# Patient Record
Sex: FEMALE | Race: ASIAN | NOT HISPANIC OR LATINO | Employment: UNEMPLOYED | URBAN - METROPOLITAN AREA
[De-identification: names, ages, dates, MRNs, and addresses within clinical notes are randomized per-mention and may not be internally consistent; named-entity substitution may affect disease eponyms.]

---

## 2017-03-23 ENCOUNTER — OFFICE VISIT - HEALTHEAST (OUTPATIENT)
Dept: FAMILY MEDICINE | Facility: CLINIC | Age: 32
End: 2017-03-23

## 2017-03-23 ENCOUNTER — HOSPITAL ENCOUNTER (OUTPATIENT)
Dept: LAB | Age: 32
Setting detail: SPECIMEN
Discharge: HOME | End: 2017-03-23

## 2017-03-23 DIAGNOSIS — R50.9 FEVER: ICD-10-CM

## 2017-03-23 DIAGNOSIS — R53.83 FATIGUE: ICD-10-CM

## 2017-03-23 DIAGNOSIS — J02.9 SORE THROAT: ICD-10-CM

## 2017-04-21 ENCOUNTER — OFFICE VISIT - HEALTHEAST (OUTPATIENT)
Dept: FAMILY MEDICINE | Facility: CLINIC | Age: 32
End: 2017-04-21

## 2017-04-21 DIAGNOSIS — J20.9 BRONCHITIS, ACUTE: ICD-10-CM

## 2017-06-09 ENCOUNTER — COMMUNICATION - HEALTHEAST (OUTPATIENT)
Dept: PULMONOLOGY | Facility: OTHER | Age: 32
End: 2017-06-09

## 2017-06-09 ENCOUNTER — OFFICE VISIT - HEALTHEAST (OUTPATIENT)
Dept: FAMILY MEDICINE | Facility: CLINIC | Age: 32
End: 2017-06-09

## 2017-06-09 DIAGNOSIS — J20.9 BRONCHITIS, ACUTE: ICD-10-CM

## 2017-06-09 DIAGNOSIS — Z87.828 HISTORY OF TRAUMATIC HEAD INJURY: ICD-10-CM

## 2017-06-09 DIAGNOSIS — F43.10 PTSD (POST-TRAUMATIC STRESS DISORDER): ICD-10-CM

## 2017-06-09 DIAGNOSIS — J30.2 ALLERGIC RHINITIS, SEASONAL: ICD-10-CM

## 2017-06-09 DIAGNOSIS — J98.4 OTHER DISEASES OF LUNG, NOT ELSEWHERE CLASSIFIED: ICD-10-CM

## 2017-06-09 ASSESSMENT — MIFFLIN-ST. JEOR: SCORE: 912.79

## 2017-07-13 ENCOUNTER — OFFICE VISIT - HEALTHEAST (OUTPATIENT)
Dept: PULMONOLOGY | Facility: OTHER | Age: 32
End: 2017-07-13

## 2017-07-13 DIAGNOSIS — J20.9 ACUTE BRONCHITIS, UNSPECIFIED ORGANISM: ICD-10-CM

## 2017-07-13 ASSESSMENT — MIFFLIN-ST. JEOR: SCORE: 919.14

## 2017-09-19 ENCOUNTER — OFFICE VISIT - HEALTHEAST (OUTPATIENT)
Dept: FAMILY MEDICINE | Facility: CLINIC | Age: 32
End: 2017-09-19

## 2017-09-19 ENCOUNTER — RECORDS - HEALTHEAST (OUTPATIENT)
Dept: ADMINISTRATIVE | Facility: OTHER | Age: 32
End: 2017-09-19

## 2017-09-19 DIAGNOSIS — B85.2 LICE: ICD-10-CM

## 2017-09-19 DIAGNOSIS — J98.4 OTHER DISEASES OF LUNG, NOT ELSEWHERE CLASSIFIED: ICD-10-CM

## 2017-09-19 DIAGNOSIS — F43.10 PTSD (POST-TRAUMATIC STRESS DISORDER): ICD-10-CM

## 2017-09-19 DIAGNOSIS — Z23 NEED FOR IMMUNIZATION AGAINST INFLUENZA: ICD-10-CM

## 2017-09-19 ASSESSMENT — MIFFLIN-ST. JEOR: SCORE: 917.32

## 2017-11-08 ENCOUNTER — OFFICE VISIT - HEALTHEAST (OUTPATIENT)
Dept: FAMILY MEDICINE | Facility: CLINIC | Age: 32
End: 2017-11-08

## 2017-11-08 DIAGNOSIS — Z30.46 ENCOUNTER FOR NEXPLANON REMOVAL: ICD-10-CM

## 2017-11-08 ASSESSMENT — MIFFLIN-ST. JEOR: SCORE: 911.65

## 2017-12-05 ENCOUNTER — COMMUNICATION - HEALTHEAST (OUTPATIENT)
Dept: FAMILY MEDICINE | Facility: CLINIC | Age: 32
End: 2017-12-05

## 2017-12-05 ENCOUNTER — OFFICE VISIT - HEALTHEAST (OUTPATIENT)
Dept: FAMILY MEDICINE | Facility: CLINIC | Age: 32
End: 2017-12-05

## 2017-12-05 DIAGNOSIS — R05.9 COUGH: ICD-10-CM

## 2017-12-05 DIAGNOSIS — J98.4 RESTRICTIVE LUNG DISEASE: ICD-10-CM

## 2017-12-05 DIAGNOSIS — M41.20 SCOLIOSIS (AND KYPHOSCOLIOSIS), IDIOPATHIC: ICD-10-CM

## 2017-12-05 DIAGNOSIS — R53.83 FATIGUE: ICD-10-CM

## 2017-12-05 DIAGNOSIS — R06.02 SOB (SHORTNESS OF BREATH): ICD-10-CM

## 2017-12-05 DIAGNOSIS — J02.9 SORE THROAT: ICD-10-CM

## 2017-12-09 ENCOUNTER — OFFICE VISIT - HEALTHEAST (OUTPATIENT)
Dept: FAMILY MEDICINE | Facility: CLINIC | Age: 32
End: 2017-12-09

## 2017-12-09 DIAGNOSIS — R53.83 FATIGUE: ICD-10-CM

## 2017-12-09 DIAGNOSIS — M89.8X1 PAIN OF RIGHT SCAPULA: ICD-10-CM

## 2018-01-13 ENCOUNTER — OFFICE VISIT - HEALTHEAST (OUTPATIENT)
Dept: FAMILY MEDICINE | Facility: CLINIC | Age: 33
End: 2018-01-13

## 2018-01-13 DIAGNOSIS — K21.9 GERD (GASTROESOPHAGEAL REFLUX DISEASE): ICD-10-CM

## 2018-01-13 DIAGNOSIS — J02.0 STREP SORE THROAT: ICD-10-CM

## 2018-01-13 LAB — DEPRECATED S PYO AG THROAT QL EIA: ABNORMAL

## 2018-01-31 ENCOUNTER — AMBULATORY - HEALTHEAST (OUTPATIENT)
Dept: FAMILY MEDICINE | Facility: CLINIC | Age: 33
End: 2018-01-31

## 2018-01-31 ENCOUNTER — AMBULATORY - HEALTHEAST (OUTPATIENT)
Dept: NURSING | Facility: CLINIC | Age: 33
End: 2018-01-31

## 2018-01-31 DIAGNOSIS — Z30.42 ENCOUNTER FOR SURVEILLANCE OF INJECTABLE CONTRACEPTIVE: ICD-10-CM

## 2018-01-31 LAB
HCG UR QL: NEGATIVE
SP GR UR STRIP: 1.02 (ref 1–1.03)

## 2018-02-01 ENCOUNTER — OFFICE VISIT - HEALTHEAST (OUTPATIENT)
Dept: FAMILY MEDICINE | Facility: CLINIC | Age: 33
End: 2018-02-01

## 2018-02-01 DIAGNOSIS — E55.9 VITAMIN D DEFICIENCY: ICD-10-CM

## 2018-02-01 DIAGNOSIS — Z00.00 ROUTINE GENERAL MEDICAL EXAMINATION AT A HEALTH CARE FACILITY: ICD-10-CM

## 2018-02-01 DIAGNOSIS — R63.6 UNDERWEIGHT: ICD-10-CM

## 2018-02-01 DIAGNOSIS — D64.9 ANEMIA: ICD-10-CM

## 2018-02-01 DIAGNOSIS — N91.2 AMENORRHEA: ICD-10-CM

## 2018-02-01 DIAGNOSIS — M41.20 SCOLIOSIS (AND KYPHOSCOLIOSIS), IDIOPATHIC: ICD-10-CM

## 2018-02-01 DIAGNOSIS — D72.829 ELEVATED WBC COUNT: ICD-10-CM

## 2018-02-01 LAB
ALBUMIN SERPL-MCNC: 3.7 G/DL (ref 3.5–5)
ALP SERPL-CCNC: 64 U/L (ref 45–120)
ALT SERPL W P-5'-P-CCNC: 15 U/L (ref 0–45)
ANION GAP SERPL CALCULATED.3IONS-SCNC: 11 MMOL/L (ref 5–18)
AST SERPL W P-5'-P-CCNC: 20 U/L (ref 0–40)
BASOPHILS # BLD AUTO: 0 THOU/UL (ref 0–0.2)
BASOPHILS NFR BLD AUTO: 1 % (ref 0–2)
BILIRUB SERPL-MCNC: 1 MG/DL (ref 0–1)
BUN SERPL-MCNC: 13 MG/DL (ref 8–22)
CALCIUM SERPL-MCNC: 9.3 MG/DL (ref 8.5–10.5)
CHLORIDE BLD-SCNC: 104 MMOL/L (ref 98–107)
CHOLEST SERPL-MCNC: 192 MG/DL
CLUE CELLS: NORMAL
CO2 SERPL-SCNC: 25 MMOL/L (ref 22–31)
CREAT SERPL-MCNC: 0.74 MG/DL (ref 0.6–1.1)
EOSINOPHIL # BLD AUTO: 0.5 THOU/UL (ref 0–0.4)
EOSINOPHIL NFR BLD AUTO: 8 % (ref 0–6)
ERYTHROCYTE [DISTWIDTH] IN BLOOD BY AUTOMATED COUNT: 12.4 % (ref 11–14.5)
FASTING STATUS PATIENT QL REPORTED: NO
GFR SERPL CREATININE-BSD FRML MDRD: >60 ML/MIN/1.73M2
GLUCOSE BLD-MCNC: 94 MG/DL (ref 70–125)
HCT VFR BLD AUTO: 38 % (ref 35–47)
HDLC SERPL-MCNC: 57 MG/DL
HGB BLD-MCNC: 12 G/DL (ref 12–16)
LDLC SERPL CALC-MCNC: 122 MG/DL
LYMPHOCYTES # BLD AUTO: 2.2 THOU/UL (ref 0.8–4.4)
LYMPHOCYTES NFR BLD AUTO: 36 % (ref 20–40)
MCH RBC QN AUTO: 26.4 PG (ref 27–34)
MCHC RBC AUTO-ENTMCNC: 31.5 G/DL (ref 32–36)
MCV RBC AUTO: 84 FL (ref 80–100)
MONOCYTES # BLD AUTO: 0.6 THOU/UL (ref 0–0.9)
MONOCYTES NFR BLD AUTO: 9 % (ref 2–10)
NEUTROPHILS # BLD AUTO: 2.9 THOU/UL (ref 2–7.7)
NEUTROPHILS NFR BLD AUTO: 47 % (ref 50–70)
PLATELET # BLD AUTO: 268 THOU/UL (ref 140–440)
PMV BLD AUTO: 8.5 FL (ref 7–10)
POTASSIUM BLD-SCNC: 4.5 MMOL/L (ref 3.5–5)
PROLACTIN SERPL-MCNC: 7.4 NG/ML (ref 0–20)
PROT SERPL-MCNC: 7.9 G/DL (ref 6–8)
RBC # BLD AUTO: 4.52 MILL/UL (ref 3.8–5.4)
SODIUM SERPL-SCNC: 140 MMOL/L (ref 136–145)
TRICHOMONAS, WET PREP: NORMAL
TRIGL SERPL-MCNC: 64 MG/DL
TSH SERPL DL<=0.005 MIU/L-ACNC: 1.39 UIU/ML (ref 0.3–5)
WBC: 6.2 THOU/UL (ref 4–11)
YEAST, WET PREP: NORMAL

## 2018-02-01 ASSESSMENT — MIFFLIN-ST. JEOR: SCORE: 927.52

## 2018-02-02 LAB
25(OH)D3 SERPL-MCNC: 10.4 NG/ML (ref 30–80)
HPV SOURCE: NORMAL
HUMAN PAPILLOMA VIRUS 16 DNA: NEGATIVE
HUMAN PAPILLOMA VIRUS 18 DNA: NEGATIVE
HUMAN PAPILLOMA VIRUS FINAL DIAGNOSIS: NORMAL
HUMAN PAPILLOMA VIRUS OTHER HR: NEGATIVE
SPECIMEN DESCRIPTION: NORMAL

## 2018-02-08 LAB
BKR LAB AP ABNORMAL BLEEDING: NO
BKR LAB AP BIRTH CONTROL/HORMONES: NORMAL
BKR LAB AP CERVICAL APPEARANCE: NORMAL
BKR LAB AP GYN ADEQUACY: NORMAL
BKR LAB AP GYN INTERPRETATION: NORMAL
BKR LAB AP HPV REFLEX: NORMAL
BKR LAB AP LMP: NORMAL
BKR LAB AP PATIENT STATUS: NORMAL
BKR LAB AP PREVIOUS ABNORMAL: NO
BKR LAB AP PREVIOUS NORMAL: NORMAL
HIGH RISK?: NO
PATH REPORT.COMMENTS IMP SPEC: NORMAL
RESULT FLAG (HE HISTORICAL CONVERSION): NORMAL

## 2018-02-14 ENCOUNTER — COMMUNICATION - HEALTHEAST (OUTPATIENT)
Dept: FAMILY MEDICINE | Facility: CLINIC | Age: 33
End: 2018-02-14

## 2018-02-15 ENCOUNTER — RECORDS - HEALTHEAST (OUTPATIENT)
Dept: ADMINISTRATIVE | Facility: OTHER | Age: 33
End: 2018-02-15

## 2018-03-15 ENCOUNTER — OFFICE VISIT - HEALTHEAST (OUTPATIENT)
Dept: FAMILY MEDICINE | Facility: CLINIC | Age: 33
End: 2018-03-15

## 2018-03-15 DIAGNOSIS — J98.4 RESTRICTIVE LUNG DISEASE: ICD-10-CM

## 2018-03-15 DIAGNOSIS — R05.8 POST-VIRAL COUGH SYNDROME: ICD-10-CM

## 2018-03-15 ASSESSMENT — MIFFLIN-ST. JEOR: SCORE: 942.27

## 2018-04-10 ENCOUNTER — OFFICE VISIT - HEALTHEAST (OUTPATIENT)
Dept: FAMILY MEDICINE | Facility: CLINIC | Age: 33
End: 2018-04-10

## 2018-04-10 DIAGNOSIS — J02.9 SORE THROAT: ICD-10-CM

## 2018-04-10 DIAGNOSIS — R09.82 POST-NASAL DRIP: ICD-10-CM

## 2018-04-10 LAB — DEPRECATED S PYO AG THROAT QL EIA: NORMAL

## 2018-04-11 LAB — GROUP A STREP BY PCR: NORMAL

## 2018-05-30 ENCOUNTER — RECORDS - HEALTHEAST (OUTPATIENT)
Dept: ADMINISTRATIVE | Facility: OTHER | Age: 33
End: 2018-05-30

## 2018-09-21 ENCOUNTER — OFFICE VISIT - HEALTHEAST (OUTPATIENT)
Dept: FAMILY MEDICINE | Facility: CLINIC | Age: 33
End: 2018-09-21

## 2018-09-21 DIAGNOSIS — J02.9 SORE THROAT: ICD-10-CM

## 2018-09-21 DIAGNOSIS — J30.1 ACUTE SEASONAL ALLERGIC RHINITIS DUE TO POLLEN: ICD-10-CM

## 2018-09-21 DIAGNOSIS — Z23 NEED FOR IMMUNIZATION AGAINST INFLUENZA: ICD-10-CM

## 2018-09-21 DIAGNOSIS — R05.8 DRY COUGH: ICD-10-CM

## 2018-09-21 LAB — DEPRECATED S PYO AG THROAT QL EIA: ABNORMAL

## 2018-09-21 ASSESSMENT — MIFFLIN-ST. JEOR: SCORE: 932.06

## 2018-09-22 ENCOUNTER — COMMUNICATION - HEALTHEAST (OUTPATIENT)
Dept: FAMILY MEDICINE | Facility: CLINIC | Age: 33
End: 2018-09-22

## 2019-04-16 ENCOUNTER — COMMUNICATION - HEALTHEAST (OUTPATIENT)
Dept: FAMILY MEDICINE | Facility: CLINIC | Age: 34
End: 2019-04-16

## 2019-04-16 DIAGNOSIS — Z11.1 SCREENING FOR TUBERCULOSIS: ICD-10-CM

## 2019-04-19 ENCOUNTER — AMBULATORY - HEALTHEAST (OUTPATIENT)
Dept: LAB | Facility: CLINIC | Age: 34
End: 2019-04-19

## 2019-04-19 DIAGNOSIS — Z11.1 SCREENING FOR TUBERCULOSIS: ICD-10-CM

## 2019-04-22 ENCOUNTER — COMMUNICATION - HEALTHEAST (OUTPATIENT)
Dept: FAMILY MEDICINE | Facility: CLINIC | Age: 34
End: 2019-04-22

## 2019-04-22 LAB
GAMMA INTERFERON BACKGROUND BLD IA-ACNC: 0.17 IU/ML
M TB IFN-G BLD-IMP: NEGATIVE
MITOGEN IGNF BCKGRD COR BLD-ACNC: 0.14 IU/ML
MITOGEN IGNF BCKGRD COR BLD-ACNC: 0.23 IU/ML
QTF INTERPRETATION: NORMAL
QTF MITOGEN - NIL: 8.77 IU/ML

## 2019-06-06 ENCOUNTER — OFFICE VISIT - HEALTHEAST (OUTPATIENT)
Dept: FAMILY MEDICINE | Facility: CLINIC | Age: 34
End: 2019-06-06

## 2019-07-05 ENCOUNTER — SURGERY - HEALTHEAST (OUTPATIENT)
Dept: SURGERY | Facility: HOSPITAL | Age: 34
End: 2019-07-05

## 2019-07-05 ENCOUNTER — ANESTHESIA - HEALTHEAST (OUTPATIENT)
Dept: SURGERY | Facility: HOSPITAL | Age: 34
End: 2019-07-05

## 2019-07-05 ASSESSMENT — MIFFLIN-ST. JEOR: SCORE: 940.79

## 2019-07-06 ASSESSMENT — MIFFLIN-ST. JEOR: SCORE: 1006.56

## 2019-07-09 ENCOUNTER — OFFICE VISIT - HEALTHEAST (OUTPATIENT)
Dept: FAMILY MEDICINE | Facility: CLINIC | Age: 34
End: 2019-07-09

## 2019-07-09 ENCOUNTER — COMMUNICATION - HEALTHEAST (OUTPATIENT)
Dept: CARE COORDINATION | Facility: CLINIC | Age: 34
End: 2019-07-09

## 2019-07-09 DIAGNOSIS — D62 ANEMIA DUE TO BLOOD LOSS, ACUTE: ICD-10-CM

## 2019-07-09 DIAGNOSIS — R63.6 UNDERWEIGHT: ICD-10-CM

## 2019-07-09 DIAGNOSIS — K59.01 SLOW TRANSIT CONSTIPATION: ICD-10-CM

## 2019-07-09 DIAGNOSIS — K35.30 ACUTE APPENDICITIS WITH LOCALIZED PERITONITIS, UNSPECIFIED WHETHER ABSCESS PRESENT, UNSPECIFIED WHETHER GANGRENE PRESENT, UNSPECIFIED WHETHER PERFORATION PRESENT: ICD-10-CM

## 2019-07-09 ASSESSMENT — MIFFLIN-ST. JEOR: SCORE: 950.77

## 2019-11-07 ENCOUNTER — OFFICE VISIT - HEALTHEAST (OUTPATIENT)
Dept: FAMILY MEDICINE | Facility: CLINIC | Age: 34
End: 2019-11-07

## 2019-11-07 DIAGNOSIS — R05.9 COUGH: ICD-10-CM

## 2019-11-07 DIAGNOSIS — R50.9 FEVER, UNSPECIFIED FEVER CAUSE: ICD-10-CM

## 2019-11-07 DIAGNOSIS — J20.9 ACUTE BRONCHITIS, UNSPECIFIED ORGANISM: ICD-10-CM

## 2019-11-07 LAB
DEPRECATED S PYO AG THROAT QL EIA: NORMAL
FLUAV AG SPEC QL IA: NORMAL
FLUBV AG SPEC QL IA: NORMAL

## 2019-11-07 ASSESSMENT — MIFFLIN-ST. JEOR: SCORE: 934.33

## 2019-11-08 LAB — GROUP A STREP BY PCR: NORMAL

## 2019-11-11 ENCOUNTER — COMMUNICATION - HEALTHEAST (OUTPATIENT)
Dept: FAMILY MEDICINE | Facility: CLINIC | Age: 34
End: 2019-11-11

## 2019-12-23 ENCOUNTER — RECORDS - HEALTHEAST (OUTPATIENT)
Dept: ADMINISTRATIVE | Facility: OTHER | Age: 34
End: 2019-12-23

## 2020-01-03 ENCOUNTER — OFFICE VISIT - HEALTHEAST (OUTPATIENT)
Dept: FAMILY MEDICINE | Facility: CLINIC | Age: 35
End: 2020-01-03

## 2020-01-03 DIAGNOSIS — Z09 HOSPITAL DISCHARGE FOLLOW-UP: ICD-10-CM

## 2020-01-03 DIAGNOSIS — D64.9 NORMOCYTIC ANEMIA: ICD-10-CM

## 2020-01-03 DIAGNOSIS — J18.9 COMMUNITY ACQUIRED PNEUMONIA OF RIGHT LUNG, UNSPECIFIED PART OF LUNG: ICD-10-CM

## 2020-01-03 DIAGNOSIS — Z23 NEED FOR IMMUNIZATION AGAINST INFLUENZA: ICD-10-CM

## 2020-01-03 LAB
FERRITIN SERPL-MCNC: 109 NG/ML (ref 10–130)
FOLATE SERPL-MCNC: 4.2 NG/ML
IRON SERPL-MCNC: 103 UG/DL (ref 42–175)
VIT B12 SERPL-MCNC: 722 PG/ML (ref 213–816)

## 2020-01-03 ASSESSMENT — MIFFLIN-ST. JEOR: SCORE: 934.33

## 2020-01-09 ENCOUNTER — COMMUNICATION - HEALTHEAST (OUTPATIENT)
Dept: FAMILY MEDICINE | Facility: CLINIC | Age: 35
End: 2020-01-09

## 2020-01-09 LAB
HEMOGLOBIN A2 QUANTITATION: 3.3 % (ref 2.2–3.5)
HEMOGLOBIN ELECTROPHRESIS: NORMAL
HEMOGLOBIN F QUANTITATION: <0.8 % (ref 0–2)
PATH ICD:: NORMAL
REVIEWING PATHOLOGIST: NORMAL

## 2020-02-01 ENCOUNTER — OFFICE VISIT - HEALTHEAST (OUTPATIENT)
Dept: FAMILY MEDICINE | Facility: CLINIC | Age: 35
End: 2020-02-01

## 2020-02-01 DIAGNOSIS — R07.0 THROAT PAIN: ICD-10-CM

## 2020-02-01 DIAGNOSIS — J11.1 INFLUENZA-LIKE ILLNESS: ICD-10-CM

## 2020-02-01 LAB — DEPRECATED S PYO AG THROAT QL EIA: NORMAL

## 2020-02-01 ASSESSMENT — MIFFLIN-ST. JEOR: SCORE: 935.69

## 2020-02-02 LAB — GROUP A STREP BY PCR: NORMAL

## 2020-03-03 ENCOUNTER — OFFICE VISIT - HEALTHEAST (OUTPATIENT)
Dept: FAMILY MEDICINE | Facility: CLINIC | Age: 35
End: 2020-03-03

## 2020-08-11 ENCOUNTER — OFFICE VISIT - HEALTHEAST (OUTPATIENT)
Dept: FAMILY MEDICINE | Facility: CLINIC | Age: 35
End: 2020-08-11

## 2020-08-11 DIAGNOSIS — R63.6 UNDERWEIGHT: ICD-10-CM

## 2020-08-11 DIAGNOSIS — R63.0 POOR APPETITE: ICD-10-CM

## 2020-08-11 DIAGNOSIS — G47.00 INSOMNIA, UNSPECIFIED TYPE: ICD-10-CM

## 2020-08-11 DIAGNOSIS — Z31.69 PRE-CONCEPTION COUNSELING: ICD-10-CM

## 2020-08-11 LAB — TSH SERPL DL<=0.005 MIU/L-ACNC: 1.41 UIU/ML (ref 0.3–5)

## 2020-08-11 RX ORDER — PRENATAL VIT/IRON FUM/FOLIC AC 27MG-0.8MG
1 TABLET ORAL DAILY
Qty: 90 TABLET | Refills: 3 | Status: SHIPPED | OUTPATIENT
Start: 2020-08-11 | End: 2023-01-08

## 2020-08-11 RX ORDER — MIRTAZAPINE 7.5 MG/1
7.5 TABLET, FILM COATED ORAL AT BEDTIME
Qty: 90 TABLET | Refills: 4 | Status: SHIPPED | OUTPATIENT
Start: 2020-08-11 | End: 2023-01-08

## 2020-08-11 ASSESSMENT — MIFFLIN-ST. JEOR: SCORE: 941.7

## 2020-08-14 ENCOUNTER — OFFICE VISIT - HEALTHEAST (OUTPATIENT)
Dept: FAMILY MEDICINE | Facility: CLINIC | Age: 35
End: 2020-08-14

## 2020-08-14 DIAGNOSIS — J02.9 SORE THROAT: ICD-10-CM

## 2020-08-14 DIAGNOSIS — H66.002 NON-RECURRENT ACUTE SUPPURATIVE OTITIS MEDIA OF LEFT EAR WITHOUT SPONTANEOUS RUPTURE OF TYMPANIC MEMBRANE: ICD-10-CM

## 2020-08-14 LAB — DEPRECATED S PYO AG THROAT QL EIA: NORMAL

## 2020-08-15 LAB — GROUP A STREP BY PCR: NORMAL

## 2020-09-05 ENCOUNTER — OFFICE VISIT - HEALTHEAST (OUTPATIENT)
Dept: FAMILY MEDICINE | Facility: CLINIC | Age: 35
End: 2020-09-05

## 2020-09-05 DIAGNOSIS — N39.0 URINARY TRACT INFECTION IN FEMALE: ICD-10-CM

## 2020-09-05 DIAGNOSIS — R31.0 GROSS HEMATURIA: ICD-10-CM

## 2020-09-05 LAB
ALBUMIN UR-MCNC: ABNORMAL MG/DL
APPEARANCE UR: CLEAR
BACTERIA #/AREA URNS HPF: ABNORMAL HPF
BILIRUB UR QL STRIP: ABNORMAL
COLOR UR AUTO: YELLOW
GLUCOSE UR STRIP-MCNC: NEGATIVE MG/DL
HGB UR QL STRIP: ABNORMAL
KETONES UR STRIP-MCNC: NEGATIVE MG/DL
LEUKOCYTE ESTERASE UR QL STRIP: ABNORMAL
NITRATE UR QL: NEGATIVE
PH UR STRIP: 6.5 [PH] (ref 5–8)
RBC #/AREA URNS AUTO: >100 HPF
SP GR UR STRIP: 1.02 (ref 1–1.03)
SQUAMOUS #/AREA URNS AUTO: ABNORMAL LPF
UROBILINOGEN UR STRIP-ACNC: ABNORMAL
WBC #/AREA URNS AUTO: ABNORMAL HPF

## 2020-09-08 LAB — BACTERIA SPEC CULT: ABNORMAL

## 2020-09-09 ENCOUNTER — COMMUNICATION - HEALTHEAST (OUTPATIENT)
Dept: FAMILY MEDICINE | Facility: CLINIC | Age: 35
End: 2020-09-09

## 2020-09-09 DIAGNOSIS — N30.01 ACUTE CYSTITIS WITH HEMATURIA: ICD-10-CM

## 2020-09-22 ENCOUNTER — OFFICE VISIT - HEALTHEAST (OUTPATIENT)
Dept: FAMILY MEDICINE | Facility: CLINIC | Age: 35
End: 2020-09-22

## 2020-09-22 DIAGNOSIS — Z30.432 ENCOUNTER FOR IUD REMOVAL: ICD-10-CM

## 2020-09-22 DIAGNOSIS — Z23 NEED FOR IMMUNIZATION AGAINST INFLUENZA: ICD-10-CM

## 2020-09-22 DIAGNOSIS — N89.8 VAGINAL DISCHARGE: ICD-10-CM

## 2020-09-22 LAB
CLUE CELLS: NORMAL
TRICHOMONAS, WET PREP: NORMAL
YEAST, WET PREP: NORMAL

## 2020-09-22 ASSESSMENT — MIFFLIN-ST. JEOR: SCORE: 952.72

## 2020-09-23 LAB
C TRACH DNA SPEC QL PROBE+SIG AMP: NEGATIVE
N GONORRHOEA DNA SPEC QL NAA+PROBE: NEGATIVE

## 2020-10-13 ENCOUNTER — OFFICE VISIT - HEALTHEAST (OUTPATIENT)
Dept: FAMILY MEDICINE | Facility: CLINIC | Age: 35
End: 2020-10-13

## 2020-10-13 DIAGNOSIS — M54.50 RIGHT-SIDED LOW BACK PAIN WITHOUT SCIATICA, UNSPECIFIED CHRONICITY: ICD-10-CM

## 2020-10-13 DIAGNOSIS — R30.0 DYSURIA: ICD-10-CM

## 2020-10-13 LAB
ALBUMIN UR-MCNC: NEGATIVE MG/DL
APPEARANCE UR: ABNORMAL
BACTERIA #/AREA URNS HPF: ABNORMAL HPF
BILIRUB UR QL STRIP: NEGATIVE
COLOR UR AUTO: YELLOW
GLUCOSE UR STRIP-MCNC: NEGATIVE MG/DL
HGB UR QL STRIP: ABNORMAL
KETONES UR STRIP-MCNC: NEGATIVE MG/DL
LEUKOCYTE ESTERASE UR QL STRIP: NEGATIVE
NITRATE UR QL: NEGATIVE
PH UR STRIP: 6.5 [PH] (ref 5–8)
RBC #/AREA URNS AUTO: ABNORMAL HPF
SP GR UR STRIP: 1.02 (ref 1–1.03)
SQUAMOUS #/AREA URNS AUTO: ABNORMAL LPF
UROBILINOGEN UR STRIP-ACNC: ABNORMAL
WBC #/AREA URNS AUTO: ABNORMAL HPF

## 2020-10-13 ASSESSMENT — MIFFLIN-ST. JEOR: SCORE: 968.77

## 2020-10-23 ENCOUNTER — OFFICE VISIT - HEALTHEAST (OUTPATIENT)
Dept: FAMILY MEDICINE | Facility: CLINIC | Age: 35
End: 2020-10-23

## 2020-10-23 DIAGNOSIS — R30.0 DYSURIA: ICD-10-CM

## 2020-10-23 DIAGNOSIS — N39.0 URINARY TRACT INFECTION WITHOUT HEMATURIA, SITE UNSPECIFIED: ICD-10-CM

## 2020-10-23 LAB
ALBUMIN UR-MCNC: ABNORMAL MG/DL
APPEARANCE UR: ABNORMAL
BACTERIA #/AREA URNS HPF: ABNORMAL HPF
BILIRUB UR QL STRIP: NEGATIVE
COLOR UR AUTO: YELLOW
GLUCOSE UR STRIP-MCNC: ABNORMAL MG/DL
HCG UR QL: NEGATIVE
HGB UR QL STRIP: ABNORMAL
KETONES UR STRIP-MCNC: NEGATIVE MG/DL
LEUKOCYTE ESTERASE UR QL STRIP: ABNORMAL
NITRATE UR QL: NEGATIVE
PH UR STRIP: 6 [PH] (ref 5–8)
RBC #/AREA URNS AUTO: ABNORMAL HPF
SP GR UR STRIP: 1.02 (ref 1–1.03)
SQUAMOUS #/AREA URNS AUTO: ABNORMAL LPF
UROBILINOGEN UR STRIP-ACNC: ABNORMAL
WBC #/AREA URNS AUTO: >100 HPF
WBC CLUMPS #/AREA URNS HPF: PRESENT /[HPF]

## 2020-10-25 LAB — BACTERIA SPEC CULT: ABNORMAL

## 2021-04-19 ENCOUNTER — OFFICE VISIT - HEALTHEAST (OUTPATIENT)
Dept: FAMILY MEDICINE | Facility: CLINIC | Age: 36
End: 2021-04-19

## 2021-04-19 DIAGNOSIS — J02.9 SORE THROAT: ICD-10-CM

## 2021-04-19 DIAGNOSIS — K21.00 GASTROESOPHAGEAL REFLUX DISEASE WITH ESOPHAGITIS, UNSPECIFIED WHETHER HEMORRHAGE: ICD-10-CM

## 2021-04-19 LAB
DEPRECATED S PYO AG THROAT QL EIA: NORMAL
GROUP A STREP BY PCR: NORMAL

## 2021-04-19 RX ORDER — ACETAMINOPHEN 500 MG
1000 TABLET ORAL EVERY 6 HOURS PRN
Qty: 100 TABLET | Refills: 0 | Status: SHIPPED | OUTPATIENT
Start: 2021-04-19 | End: 2023-01-08

## 2021-04-20 ENCOUNTER — AMBULATORY - HEALTHEAST (OUTPATIENT)
Dept: FAMILY MEDICINE | Facility: CLINIC | Age: 36
End: 2021-04-20

## 2021-04-21 LAB
SARS-COV-2 PCR COMMENT: NORMAL
SARS-COV-2 RNA SPEC QL NAA+PROBE: NEGATIVE
SARS-COV-2 VIRUS SPECIMEN SOURCE: NORMAL

## 2021-04-22 ENCOUNTER — COMMUNICATION - HEALTHEAST (OUTPATIENT)
Dept: SCHEDULING | Facility: CLINIC | Age: 36
End: 2021-04-22

## 2021-05-10 ENCOUNTER — OFFICE VISIT - HEALTHEAST (OUTPATIENT)
Dept: FAMILY MEDICINE | Facility: CLINIC | Age: 36
End: 2021-05-10

## 2021-05-10 DIAGNOSIS — R30.0 DYSURIA: ICD-10-CM

## 2021-05-10 DIAGNOSIS — R10.30 LOWER ABDOMINAL PAIN: ICD-10-CM

## 2021-05-10 DIAGNOSIS — M54.50 ACUTE BILATERAL LOW BACK PAIN WITHOUT SCIATICA: ICD-10-CM

## 2021-05-10 LAB
ALBUMIN UR-MCNC: NEGATIVE G/DL
APPEARANCE UR: CLEAR
BACTERIA #/AREA URNS HPF: ABNORMAL /[HPF]
BILIRUB UR QL STRIP: NEGATIVE
CLUE CELLS: NORMAL
COLOR UR AUTO: YELLOW
GLUCOSE UR STRIP-MCNC: NEGATIVE MG/DL
HGB UR QL STRIP: NEGATIVE
KETONES UR STRIP-MCNC: NEGATIVE MG/DL
LEUKOCYTE ESTERASE UR QL STRIP: ABNORMAL
NITRATE UR QL: NEGATIVE
PH UR STRIP: 7 [PH] (ref 5–8)
RBC #/AREA URNS AUTO: ABNORMAL HPF
SP GR UR STRIP: 1.02 (ref 1–1.03)
SQUAMOUS #/AREA URNS AUTO: ABNORMAL LPF
TRICHOMONAS, WET PREP: NORMAL
UROBILINOGEN UR STRIP-ACNC: ABNORMAL
WBC #/AREA URNS AUTO: ABNORMAL HPF
YEAST, WET PREP: NORMAL

## 2021-05-11 LAB
BACTERIA SPEC CULT: NO GROWTH
C TRACH DNA SPEC QL PROBE+SIG AMP: NEGATIVE
N GONORRHOEA DNA SPEC QL NAA+PROBE: NEGATIVE

## 2021-05-14 ENCOUNTER — OFFICE VISIT - HEALTHEAST (OUTPATIENT)
Dept: FAMILY MEDICINE | Facility: CLINIC | Age: 36
End: 2021-05-14

## 2021-05-14 DIAGNOSIS — M54.50 ACUTE BILATERAL LOW BACK PAIN WITHOUT SCIATICA: ICD-10-CM

## 2021-05-14 DIAGNOSIS — Z01.419 ENCOUNTER FOR GYNECOLOGICAL EXAMINATION WITHOUT ABNORMAL FINDING: ICD-10-CM

## 2021-05-14 ASSESSMENT — MIFFLIN-ST. JEOR: SCORE: 1009.74

## 2021-05-17 LAB
HPV SOURCE: NORMAL
HUMAN PAPILLOMA VIRUS 16 DNA: NEGATIVE
HUMAN PAPILLOMA VIRUS 18 DNA: NEGATIVE
HUMAN PAPILLOMA VIRUS FINAL DIAGNOSIS: NORMAL
HUMAN PAPILLOMA VIRUS OTHER HR: NEGATIVE
SPECIMEN DESCRIPTION: NORMAL

## 2021-05-20 LAB
BKR LAB AP ABNORMAL BLEEDING: NO
BKR LAB AP BIRTH CONTROL/HORMONES: NORMAL
BKR LAB AP CERVICAL APPEARANCE: NORMAL
BKR LAB AP GYN ADEQUACY: NORMAL
BKR LAB AP GYN INTERPRETATION: NORMAL
BKR LAB AP HPV REFLEX: NORMAL
BKR LAB AP LMP: NORMAL
BKR LAB AP PATIENT STATUS: NORMAL
BKR LAB AP PREVIOUS ABNORMAL: NORMAL
BKR LAB AP PREVIOUS NORMAL: 2018
HIGH RISK?: NO
PATH REPORT.COMMENTS IMP SPEC: NORMAL
RESULT FLAG (HE HISTORICAL CONVERSION): NORMAL

## 2021-05-24 ENCOUNTER — COMMUNICATION - HEALTHEAST (OUTPATIENT)
Dept: FAMILY MEDICINE | Facility: CLINIC | Age: 36
End: 2021-05-24

## 2021-05-26 VITALS
DIASTOLIC BLOOD PRESSURE: 79 MMHG | OXYGEN SATURATION: 98 % | TEMPERATURE: 98.4 F | HEART RATE: 118 BPM | RESPIRATION RATE: 16 BRPM | SYSTOLIC BLOOD PRESSURE: 120 MMHG

## 2021-05-27 VITALS
BODY MASS INDEX: 20.36 KG/M2 | HEIGHT: 58 IN | HEART RATE: 90 BPM | TEMPERATURE: 98.4 F | OXYGEN SATURATION: 99 % | SYSTOLIC BLOOD PRESSURE: 94 MMHG | DIASTOLIC BLOOD PRESSURE: 58 MMHG | WEIGHT: 97 LBS

## 2021-05-27 VITALS
SYSTOLIC BLOOD PRESSURE: 101 MMHG | TEMPERATURE: 97.9 F | WEIGHT: 97.06 LBS | OXYGEN SATURATION: 98 % | DIASTOLIC BLOOD PRESSURE: 66 MMHG | HEART RATE: 78 BPM

## 2021-05-27 VITALS
SYSTOLIC BLOOD PRESSURE: 114 MMHG | OXYGEN SATURATION: 98 % | DIASTOLIC BLOOD PRESSURE: 80 MMHG | RESPIRATION RATE: 18 BRPM | HEART RATE: 108 BPM | TEMPERATURE: 98.8 F

## 2021-05-27 NOTE — TELEPHONE ENCOUNTER
New Appointment Needed  What is the reason for the visit:    Mantoux Placement  Appt Request  What is the purpose of the mantoux?:  Work: Needs for work, changing to a different [employer]  Is there a form to be completed?:   No  How soon do you need the mantoux placed?:  date: As soon as possible, requests today or tomorrow    Provider Preference: Any available  How soon do you need to be seen?: requests today or tomorrow  Waitlist offered?: No  Okay to leave a detailed message:  Yes

## 2021-05-30 VITALS — BODY MASS INDEX: 18.08 KG/M2 | WEIGHT: 82.8 LBS

## 2021-05-30 VITALS — WEIGHT: 81.38 LBS | BODY MASS INDEX: 17.76 KG/M2

## 2021-05-30 NOTE — ANESTHESIA PREPROCEDURE EVALUATION
Anesthesia Evaluation      Patient summary reviewed   No history of anesthetic complications     Airway   Mallampati: I  Neck ROM: full   Pulmonary - negative ROS and normal exam                          Cardiovascular - normal exam  Exercise tolerance: > or = 4 METS  (+) dysrhythmias, ,      Neuro/Psych - negative ROS     Endo/Other - negative ROS      GI/Hepatic/Renal - negative ROS      Other findings: H/o TB, treated with 4 drug regimen. CT showed R hemithorax volume loss + bronchiectasis.  Runs a low BP, one value in chart was 89/53.  Scoliosis.  Had ablation for SVT.  Hg 12.6, K 3.7, GFR>60.      Dental - normal exam                        Anesthesia Plan  Planned anesthetic: general endotracheal    ASA 2   Induction: intravenous   Anesthetic plan and risks discussed with: patient  Anesthesia plan special considerations: antiemetics,   Post-op plan: routine recovery

## 2021-05-30 NOTE — PROGRESS NOTES
"S:  Shahbaz Nam is a 34 y.o. female who comes to the clinic today for  1.  Follow up of appendicitis:  She was found to have appendicitis and had her appendix removed.  Since that time she has felt tired, nad is feeling somewhat dizzy with standing.  Her stool is dry and hard.  No blood in her stool.  No fevers.  She is eating ok.  She is not drinking a lot of water.  She is often only eating once or twice daily.    In the hospital she was noted to have a hemoglobin at 9.6.  She was discharged from the hospital with no medications.  Her  has been giving her some over-the-counter Tylenol but then they ran out of this at home.      She does have an IUD.  This was placed in 2/18.        I reviewed the pertinent family, social, surgical, medical history.    History of lung disease.    History of arrythmia.  S/p ablation .       O:  BP (!) 88/68   Pulse 69   Temp 98.3  F (36.8  C) (Oral)   Resp 24   Ht 4' 10\" (1.473 m)   Wt (!) 84 lb (38.1 kg)   SpO2 98%   Breastfeeding? No   BMI 17.56 kg/m    Gen: no acute distress, very thin.    Neck:  Supple, no lad, no carotid bruits  Moist mucus membranes    Heart:  Regular rate and rhythm.  No m/r/g  Lungs: cta bilaterally, no wheezes or rhonchi.  Good air inspiration only on one side.  No rhonchi noted.    Abdomen:  No masses or organomegaly, soft.  Non distended.    Incisions are healing well.    Extremities:  No edema.           Patient Active Problem List   Diagnosis     Vitamin D Deficiency     Scoliosis     Amenorrhea     Restrictive lung disease     Elevated WBC count     Seasonal allergies     Appendicitis     SVT (supraventricular tachycardia) (H)     No current outpatient medications on file prior to visit.     No current facility-administered medications on file prior to visit.           No results found for this or any previous visit (from the past 48 hour(s)).     No images are attached to the encounter or orders placed in the encounter. "       Assessment/Plan:  1. Acute appendicitis with localized peritonitis, unspecified whether abscess present, unspecified whether gangrene present, unspecified whether perforation present  Now recovering well.  She can use Tylenol.  I recommended that she increase her food intake as well as her liquid intake.  We talked about how increasing her liquid intake might help with her constipation, dizziness, and her fatigue.  She should continue with vitamins for her anemia.  - acetaminophen (TYLENOL) 325 MG tablet; Take 2 tablets (650 mg total) by mouth every 6 (six) hours as needed for pain.  Dispense: 120 tablet; Refill: 12  - multivitamin with minerals tablet; Take 1 tablet by mouth daily.  Dispense: 120 tablet; Refill: 2    2. Underweight  Increase food intake to 3-4 times daily.  We talked about how this can be very small amounts at one time.  I did ask her to make sure she has high amounts of fiber as well as protein.  We talked about what all of these look like.  - multivitamin with minerals tablet; Take 1 tablet by mouth daily.  Dispense: 120 tablet; Refill: 2    3. Anemia due to blood loss, acute  Continue with vitamins.  High iron foods.    4. Slow transit constipation    - docusate sodium (COLACE) 100 MG capsule; Take 1 capsule (100 mg total) by mouth 2 (two) times a day.  Dispense: 60 capsule; Refill: 11    Follow-up with symptoms of dizziness do not improve.      Kalyani Fuentes   7/9/2019 2:05 PM

## 2021-05-30 NOTE — ANESTHESIA POSTPROCEDURE EVALUATION
Patient: Ta Cyril  APPENDECTOMY, LAPAROSCOPIC  Anesthesia type: general    Patient location: PACU  Last vitals:   Vitals Value Taken Time   /68 7/5/2019  8:40 AM   Temp 37.1  C (98.8  F) 7/5/2019  8:20 AM   Pulse 81 7/5/2019  8:45 AM   Resp 17 7/5/2019  8:45 AM   SpO2 100 % 7/5/2019  8:45 AM   Vitals shown include unvalidated device data.  Post vital signs: stable  Level of consciousness: awake, trying to get  so can communicate with her  Post-anesthesia pain: pain controlled  Post-anesthesia nausea and vomiting: no  Pulmonary: room air  Cardiovascular: stable and blood pressure at baseline  Hydration: adequate  Anesthetic events: no    QCDR Measures:  ASA# 11 - Ary-op Cardiac Arrest: ASA11B - Patient did NOT experience unanticipated cardiac arrest  ASA# 12 - Ary-op Mortality Rate: ASA12B - Patient did NOT die  ASA# 13 - PACU Re-Intubation Rate: ASA13B - Patient did NOT require a new airway mgmt  ASA# 10 - Composite Anes Safety: ASA10A - No serious adverse event    Additional Notes:

## 2021-05-30 NOTE — ANESTHESIA CARE TRANSFER NOTE
Last vitals:   Vitals:    07/05/19 0819   BP: 105/69   Pulse: 81   Resp: 16   Temp: 37.1  C (98.8  F)   SpO2: 100%     Patient's level of consciousness is drowsy  Spontaneous respirations: yes  Maintains airway independently: yes  Dentition unchanged: yes  Oropharynx: oropharynx clear of all foreign objects    QCDR Measures:  ASA# 20 - Surgical Safety Checklist: WHO surgical safety checklist completed prior to induction    PQRS# 430 - Adult PONV Prevention: 4558F - Pt received => 2 anti-emetic agents (different classes) preop & intraop  ASA# 8 - Peds PONV Prevention: NA - Not pediatric patient, not GA or 2 or more risk factors NOT present  PQRS# 424 - Ary-op Temp Management: 4559F - At least one body temp DOCUMENTED => 35.5C or 95.9F within required timeframe  PQRS# 426 - PACU Transfer Protocol: - Transfer of care checklist used  ASA# 14 - Acute Post-op Pain: ASA14B - Patient did NOT experience pain >= 7 out of 10

## 2021-05-30 NOTE — PROGRESS NOTES
TCM DISCHARGE FOLLOW UP CALL    Discharge Date:  7/6/2019  Reason for hospital stay (discharge diagnosis)::  Appendicitis, Elevated WBC count, SVT (supraventricular tachycardia)  Are you feeling better, the same or worse since your discharge?:  Patient is feeling better (Feels a little better but still some dizziness & fatigue.  Taking Tylenol for pain.  Dizziness when changing positions, started in the hospital; no dizziness at rest.  Regular BMs since home.)  Do you feel like you have a plan in the event of a health emergency?: Yes (Talks w/, clinic, ER)    As part of your discharge plan, were  home care services ordered for you?: No    Did you receive any new medications, or was there a change to your medications?: No    Do you have any follow up visits scheduled with your PCP or Specialist?:  Yes, with PCP (Dr. Fuentes today)  (RN) Is PCP appt scheduled soon enough (within 14 days of discharge date)?: Yes    RN NOTES::  Explained dizziness can be from anesthesia, low BP's (which were noted in the hospital, and may be her normal BP's), and not drinking enough fluids.  Informed pt fatigue is normal after surgery and can last 4-6 weeks while healing from surgery.  Encouraged pt to drink plenty of fluids to prevent dehydration. Pt will discuss dizziness during appt today.      Lina Mccurdy RN Care Manager, Population Health

## 2021-05-31 VITALS — BODY MASS INDEX: 18.05 KG/M2 | WEIGHT: 82.7 LBS

## 2021-05-31 VITALS — HEIGHT: 57 IN | WEIGHT: 79.75 LBS | BODY MASS INDEX: 17.2 KG/M2

## 2021-05-31 VITALS — BODY MASS INDEX: 17.26 KG/M2 | WEIGHT: 80 LBS | HEIGHT: 57 IN

## 2021-05-31 VITALS — WEIGHT: 81.4 LBS | HEIGHT: 57 IN | BODY MASS INDEX: 17.56 KG/M2

## 2021-05-31 VITALS — BODY MASS INDEX: 17.68 KG/M2 | WEIGHT: 81 LBS

## 2021-05-31 VITALS — WEIGHT: 81 LBS | BODY MASS INDEX: 17.47 KG/M2 | HEIGHT: 57 IN

## 2021-05-31 VITALS — WEIGHT: 82.6 LBS | BODY MASS INDEX: 18.03 KG/M2

## 2021-06-01 VITALS — HEIGHT: 58 IN | BODY MASS INDEX: 16.74 KG/M2 | WEIGHT: 79.75 LBS

## 2021-06-01 VITALS — HEIGHT: 58 IN | BODY MASS INDEX: 17.42 KG/M2 | WEIGHT: 83 LBS

## 2021-06-01 VITALS — WEIGHT: 80 LBS | BODY MASS INDEX: 16.87 KG/M2

## 2021-06-02 VITALS — BODY MASS INDEX: 16.95 KG/M2 | HEIGHT: 58 IN | WEIGHT: 80.75 LBS

## 2021-06-03 VITALS
DIASTOLIC BLOOD PRESSURE: 50 MMHG | HEART RATE: 101 BPM | TEMPERATURE: 100.7 F | HEIGHT: 57 IN | WEIGHT: 83 LBS | OXYGEN SATURATION: 99 % | BODY MASS INDEX: 17.91 KG/M2 | SYSTOLIC BLOOD PRESSURE: 88 MMHG

## 2021-06-03 VITALS — WEIGHT: 96.3 LBS | BODY MASS INDEX: 20.21 KG/M2 | HEIGHT: 58 IN

## 2021-06-03 VITALS — HEIGHT: 58 IN | BODY MASS INDEX: 17.63 KG/M2 | WEIGHT: 84 LBS

## 2021-06-03 NOTE — PROGRESS NOTES
"Subjective:  34 y.o. female here with concerns of of cough.  Has had symptoms for 3 days.  She did not think she had a fever but today she does on exam.  Denies ear pain.  Has sore throat and hoarse voice.  Has nasal congestion without rhinorrhea.  She has headaches myalgias and arthralgias.  She has not taken any medications.  Denies any contacts sick at home.  Cough is productive and she feels short of breath with walking.    Significant lung hx:  Patient was diagnosed with pulmonary TB 2001, received 4 drug regimen, completed six month therapy. [place] age 2 [place] 21 yo.     Objective:  BP (!) 88/50 (Patient Site: Right Arm, Patient Position: Sitting, Cuff Size: Adult Small)   Pulse (!) 101   Temp (!) 100.7  F (38.2  C) (Oral)   Ht 4' 9.25\" (1.454 m)   Wt (!) 83 lb (37.6 kg)   SpO2 99%   BMI 17.80 kg/m    GENERAL: alert, not distressed  EARS: tympanic membranes normal, external auditory canals normal  NOSE: no rhinorrhea, nasal mucosa normal  PHARYNX: no erythema or exudates  FRONTAL and MAXILLARY SINUSES: non tender  NECK: no lymphadenopathy, nodules, or rigidity.  CHEST: LLL rhonchi  CARDIAC: regular without murmur    Chest xray personally reviewed.  No clear infiltrate but findings as below reported by radiology:  Marked volume loss on the right with severe bronchiectasis and shift of the mediastinum into the right chest is unchanged. Slight increase in density in the right base where there was some residual aerated lung with silhouetting of the right   hemidiaphragm today. This may reflect increased  retained secretions with chronic progression of changes or perhaps an acute pneumonitis of the minimal normal right lung. Tracheo and bronchio-megaly also unchanged.     Nipple density on the left. Left lung is clear. Heart and pulmonary vascularity are normal.    Recent Results (from the past 240 hour(s))   Influenza A/B Rapid Test- Nasal Swab   Result Value Ref Range    Influenza  A, " Rapid Antigen No Influenza A antigen detected No Influenza A antigen detected    Influenza B, Rapid Antigen No Influenza B antigen detected No Influenza B antigen detected   Rapid Strep A Screen- Throat Swab   Result Value Ref Range    Rapid Strep A Antigen No Group A Strep detected, presumptive negative No Group A Strep detected, presumptive negative   Group A Strep, RNA Direct Detection, Throat   Result Value Ref Range    Group A Strep by PCR No Group A Strep rRNA detected No Group A Strep rRNA detected      Assessment and Plan:  1. Cough  2. Fever, unspecified fever cause  3. Acute process with underlying restrictive lung disease    Has attenuated lung function so cannot really afford to miss an opportunity to treat pulmonary pathogen so would be fairly aggressive and treat with doxycycline.  She does not have much for respiratory symptoms at the moment but if they develop I be concerned about her becoming significantly compromised.  Discussed to start the antibiotic and monitor symptoms.  If she has shortness of breath or feels that she is worsening she should present to the emergency room.    Should have a recheck next week and should have influenza vaccine when she is afebrile.

## 2021-06-03 NOTE — TELEPHONE ENCOUNTER
Relayed message to patient via language line  ID [id number].     Patient is feeling better and is scheduled for flu shot on 11/15/19 @ [facility].

## 2021-06-03 NOTE — TELEPHONE ENCOUNTER
----- Message from Waylon Olivia MD sent at 11/10/2019  1:43 PM CST -----  Please call patient to check up on symptoms.  She should get Flu vaccine when she is feeling better.

## 2021-06-04 VITALS
HEIGHT: 58 IN | TEMPERATURE: 98.2 F | OXYGEN SATURATION: 98 % | BODY MASS INDEX: 17.21 KG/M2 | DIASTOLIC BLOOD PRESSURE: 62 MMHG | WEIGHT: 82 LBS | HEART RATE: 94 BPM | SYSTOLIC BLOOD PRESSURE: 98 MMHG

## 2021-06-04 VITALS
SYSTOLIC BLOOD PRESSURE: 104 MMHG | OXYGEN SATURATION: 99 % | WEIGHT: 83.3 LBS | DIASTOLIC BLOOD PRESSURE: 73 MMHG | BODY MASS INDEX: 17.97 KG/M2 | HEART RATE: 102 BPM | TEMPERATURE: 98.9 F | HEIGHT: 57 IN

## 2021-06-04 VITALS
DIASTOLIC BLOOD PRESSURE: 64 MMHG | TEMPERATURE: 98.4 F | HEIGHT: 57 IN | RESPIRATION RATE: 16 BRPM | BODY MASS INDEX: 17.91 KG/M2 | HEART RATE: 70 BPM | OXYGEN SATURATION: 99 % | SYSTOLIC BLOOD PRESSURE: 94 MMHG | WEIGHT: 83 LBS

## 2021-06-04 NOTE — PROGRESS NOTES
"KARTHIKEYAN Nam is a 35 y.o. female here with her  for hospital follow up for pneumonia. She was discharged on 12/23. She completed antibiotics. I reviewed the entire hospital course and reconciled the medications.     She is no longer having fevers and her cough is improving. She is getting her energy and appetite back.     Of note, she has a history of pulmonary TB in 2001 and completed a 4 drug regimen. Has leftover scarring.   Past Medical History:   Diagnosis Date     Abnormal CXR (chest x-ray) 12/2017    right chest volume loss     Scoliosis      SVT (supraventricular tachycardia) (H)      Tuberculosis     had pulmonary TB in 2001, received 4 drug regimen.      Current Outpatient Medications on File Prior to Visit   Medication Sig Dispense Refill     levonorgestrel (MIRENA) 20 mcg/24 hours (5 yrs) 52 mg IUD 1 each by Intrauterine route once. Inserted at [facility]       acetaminophen (TYLENOL) 325 MG tablet Take 2 tablets (650 mg total) by mouth every 6 (six) hours as needed for pain. 120 tablet 12     docusate sodium (COLACE) 100 MG capsule Take 1 capsule (100 mg total) by mouth 2 (two) times a day. 60 capsule 11     multivitamin with minerals tablet Take 1 tablet by mouth daily. 120 tablet 2     No current facility-administered medications on file prior to visit.      ROS: 10 point ROS was negative other than that mentioned in HPI.   ?  O  BP 94/64   Pulse 70   Temp 98.4  F (36.9  C) (Oral)   Resp 16   Ht 4' 9.25\" (1.454 m)   Wt (!) 83 lb (37.6 kg)   SpO2 99%   Breastfeeding No   BMI 17.80 kg/m     Vitals reviewed. Nursing note reviewed.  General Appearance: Pleasant and alert, in no acute distress  HEENT: mucous membranes moist  CV: RRR, no murmur, rubs, gallops  Resp: No respiratory distress. Clear to auscultation bilaterally. No wheezes, rales, rhonchi  Skin: warm, dry, intact, no rash noted  Neuro: no focal deficits, CNs II-XII normal.   Psych: mood and affect are normal.    A/P  Shahbaz was seen " today for follow-up.    Diagnoses and all orders for this visit:    Hospital discharge follow-up    Need for immunization against influenza  -     Influenza, Seasonal Quad, PF =/> 6months    Community acquired pneumonia of right lung, unspecified part of lung: resolved. Lungs are clear today on auscultation and her oxygen saturation is normal.     Normocytic anemia: she has chronic normocytic anemia and I do not see in her chart that she has had the following labs checked, at least recently. Will check these to see if iron or vitamins need repletion. She is chronically underweight, though her weight has been stable for several years. BMI is 17.   -     Vitamin B12  -     Folate, Serum  -     Iron  -     Ferritin  -     Hemoglobinopathy/Thalassemia [place]         Return in about 2 months (around 3/3/2020) with PCP to recheck energy, appetite, breathing.       The entire visit was conducted through a professional .   Options for treatment and follow-up care were reviewed with the patient and/or guardian. Ta Cyril and/or guardian engaged in the decision making process and verbalized understanding of the options discussed and agreed with the final plan.    Jacqueline Monteiro MD

## 2021-06-05 VITALS
WEIGHT: 97.38 LBS | HEART RATE: 94 BPM | SYSTOLIC BLOOD PRESSURE: 95 MMHG | OXYGEN SATURATION: 98 % | BODY MASS INDEX: 20.92 KG/M2 | TEMPERATURE: 98.1 F | DIASTOLIC BLOOD PRESSURE: 63 MMHG

## 2021-06-05 VITALS
TEMPERATURE: 98.9 F | HEART RATE: 88 BPM | SYSTOLIC BLOOD PRESSURE: 90 MMHG | WEIGHT: 86.8 LBS | OXYGEN SATURATION: 97 % | HEIGHT: 57 IN | BODY MASS INDEX: 18.73 KG/M2 | DIASTOLIC BLOOD PRESSURE: 58 MMHG | RESPIRATION RATE: 14 BRPM

## 2021-06-05 VITALS
HEART RATE: 104 BPM | DIASTOLIC BLOOD PRESSURE: 71 MMHG | OXYGEN SATURATION: 98 % | RESPIRATION RATE: 16 BRPM | TEMPERATURE: 98.4 F | BODY MASS INDEX: 17.74 KG/M2 | SYSTOLIC BLOOD PRESSURE: 101 MMHG | WEIGHT: 84.9 LBS

## 2021-06-05 VITALS
OXYGEN SATURATION: 98 % | BODY MASS INDEX: 19.58 KG/M2 | RESPIRATION RATE: 20 BRPM | HEIGHT: 57 IN | DIASTOLIC BLOOD PRESSURE: 60 MMHG | TEMPERATURE: 98.4 F | SYSTOLIC BLOOD PRESSURE: 90 MMHG | HEART RATE: 72 BPM | WEIGHT: 90.75 LBS

## 2021-06-05 NOTE — PROGRESS NOTES
Chief Complaint   Patient presents with     Cough     5 days, worse at night     Nasal Congestion     Sore Throat     5 days     Chills         HPI    Patient is here for 5 days of cough, sore throat, and nasal congestion. She also reported headache, and subjective fever and chills. Her daughter is currently taking antibiotics for strep throat. No chest pain, shortness of breath.     ROS: Pertinent ROS noted in HPI.     No Known Allergies    Patient Active Problem List   Diagnosis     Vitamin D Deficiency     Scoliosis     Amenorrhea     Restrictive lung disease     Elevated WBC count     Seasonal allergies     SVT (supraventricular tachycardia) (H)     Anemia, chronic disease       Family History   Problem Relation Age of Onset     No Medical Problems Mother      Tuberculosis Father      No Medical Problems Child      No Medical Problems Child      No Medical Problems Child        Social History     Socioeconomic History     Marital status: Single     Spouse name: Not on file     Number of children: 3     Years of education: Not on file     Highest education level: Not on file   Occupational History     Not on file   Social Needs     Financial resource strain: Not on file     Food insecurity:     Worry: Not on file     Inability: Not on file     Transportation needs:     Medical: Not on file     Non-medical: Not on file   Tobacco Use     Smoking status: Never Smoker     Smokeless tobacco: Never Used   Substance and Sexual Activity     Alcohol use: No     Drug use: No     Sexual activity: Yes     Partners: Male     Birth control/protection: Injection   Lifestyle     Physical activity:     Days per week: Not on file     Minutes per session: Not on file     Stress: Not on file   Relationships     Social connections:     Talks on phone: Not on file     Gets together: Not on file     Attends [employer] service: Not on file     Active member of club or organization: Not on file     Attends meetings of clubs or  organizations: Not on file     Relationship status: Not on file     Intimate partner violence:     Fear of current or ex partner: Not on file     Emotionally abused: Not on file     Physically abused: Not on file     Forced sexual activity: Not on file   Other Topics Concern     Not on file   Social History Narrative    She has 3 children.       Objective:    Vitals:    02/01/20 1436   BP: 104/73   Pulse: (!) 102   Temp: 98.9  F (37.2  C)   SpO2: 99%       Gen:NAD  Throat: oropharynx clear, tonsils normal  Ears: TMs clear without effusion, ear canals normal with small cerumen  Nose: no discharge  Neck: No adenopathy  CV: RRR, normal S1S2, no M, R, G  Pulm: CTAB, normal effort  Abd: normal inspection, normal bowel sounds, soft, no pain, no mass/HSM  Skin: dry, warm, no acute lesions      Recent Results (from the past 24 hour(s))   Rapid Strep A Screen-Throat   Result Value Ref Range    Rapid Strep A Antigen No Group A Strep detected, presumptive negative No Group A Strep detected, presumptive negative       Influenza-like illness - test not indicated.   -     benzonatate (TESSALON) 200 MG capsule; Take 1 capsule (200 mg total) by mouth 3 (three) times a day as needed for cough.  -     ibuprofen (ADVIL,MOTRIN) 600 MG tablet; Take 1 tablet (600 mg total) by mouth every 8 (eight) hours as needed for pain.    Throat pain  -     Rapid Strep A Screen-Throat  -     Group A Strep, RNA Direct Detection, Throat  -     ibuprofen (ADVIL,MOTRIN) 600 MG tablet; Take 1 tablet (600 mg total) by mouth every 8 (eight) hours as needed for pain.    Exam normal. F/u as directed.

## 2021-06-09 ENCOUNTER — OFFICE VISIT - HEALTHEAST (OUTPATIENT)
Dept: FAMILY MEDICINE | Facility: CLINIC | Age: 36
End: 2021-06-09

## 2021-06-09 DIAGNOSIS — R42 DIZZINESS: ICD-10-CM

## 2021-06-09 DIAGNOSIS — R11.0 NAUSEA: ICD-10-CM

## 2021-06-09 LAB
ALBUMIN SERPL-MCNC: 4.3 G/DL (ref 3.5–5)
ALP SERPL-CCNC: 55 U/L (ref 45–120)
ALT SERPL W P-5'-P-CCNC: 10 U/L (ref 0–45)
ANION GAP SERPL CALCULATED.3IONS-SCNC: 9 MMOL/L (ref 5–18)
AST SERPL W P-5'-P-CCNC: 19 U/L (ref 0–40)
BASOPHILS # BLD AUTO: 0.1 THOU/UL (ref 0–0.2)
BASOPHILS NFR BLD AUTO: 1 % (ref 0–2)
BILIRUB SERPL-MCNC: 1.1 MG/DL (ref 0–1)
BUN SERPL-MCNC: 8 MG/DL (ref 8–22)
CALCIUM SERPL-MCNC: 8.9 MG/DL (ref 8.5–10.5)
CHLORIDE BLD-SCNC: 107 MMOL/L (ref 98–107)
CO2 SERPL-SCNC: 24 MMOL/L (ref 22–31)
CREAT SERPL-MCNC: 0.74 MG/DL (ref 0.6–1.1)
EOSINOPHIL # BLD AUTO: 0.3 THOU/UL (ref 0–0.4)
EOSINOPHIL NFR BLD AUTO: 2 % (ref 0–6)
ERYTHROCYTE [DISTWIDTH] IN BLOOD BY AUTOMATED COUNT: 12.5 % (ref 11–14.5)
GFR SERPL CREATININE-BSD FRML MDRD: >60 ML/MIN/1.73M2
GLUCOSE BLD-MCNC: 86 MG/DL (ref 70–125)
HCT VFR BLD AUTO: 37.7 % (ref 35–47)
HGB BLD-MCNC: 12.2 G/DL (ref 12–16)
IMM GRANULOCYTES # BLD: 0 THOU/UL
IMM GRANULOCYTES NFR BLD: 0 %
LYMPHOCYTES # BLD AUTO: 2.4 THOU/UL (ref 0.8–4.4)
LYMPHOCYTES NFR BLD AUTO: 24 % (ref 20–40)
MCH RBC QN AUTO: 27.9 PG (ref 27–34)
MCHC RBC AUTO-ENTMCNC: 32.4 G/DL (ref 32–36)
MCV RBC AUTO: 86 FL (ref 80–100)
MONOCYTES # BLD AUTO: 1.1 THOU/UL (ref 0–0.9)
MONOCYTES NFR BLD AUTO: 10 % (ref 2–10)
NEUTROPHILS # BLD AUTO: 6.5 THOU/UL (ref 2–7.7)
NEUTROPHILS NFR BLD AUTO: 63 % (ref 50–70)
PLATELET # BLD AUTO: 266 THOU/UL (ref 140–440)
PMV BLD AUTO: 9.9 FL (ref 7–10)
POTASSIUM BLD-SCNC: 3.9 MMOL/L (ref 3.5–5)
PROT SERPL-MCNC: 8 G/DL (ref 6–8)
RBC # BLD AUTO: 4.37 MILL/UL (ref 3.8–5.4)
SODIUM SERPL-SCNC: 140 MMOL/L (ref 136–145)
TSH SERPL DL<=0.005 MIU/L-ACNC: 2 UIU/ML (ref 0.3–5)
WBC: 10.3 THOU/UL (ref 4–11)

## 2021-06-09 RX ORDER — ONDANSETRON 4 MG/1
4 TABLET, ORALLY DISINTEGRATING ORAL EVERY 8 HOURS PRN
Qty: 30 TABLET | Refills: 0 | Status: SHIPPED | OUTPATIENT
Start: 2021-06-09 | End: 2023-01-08

## 2021-06-09 RX ORDER — MECLIZINE HYDROCHLORIDE 25 MG/1
25 TABLET ORAL 3 TIMES DAILY PRN
Qty: 30 TABLET | Refills: 1 | Status: SHIPPED | OUTPATIENT
Start: 2021-06-09

## 2021-06-10 ENCOUNTER — COMMUNICATION - HEALTHEAST (OUTPATIENT)
Dept: FAMILY MEDICINE | Facility: CLINIC | Age: 36
End: 2021-06-10

## 2021-06-10 NOTE — PROGRESS NOTES
Assessment/Plan:   Bronchitis, acute  Persistent and worsening cough with bronchospasm following URI and/or spring allergies  - albuterol (PROAIR HFA;PROVENTIL HFA;VENTOLIN HFA) 90 mcg/actuation inhaler; Inhale 2 puffs every 4 (four) hours as needed.  Dispense: 18 g; Refill: 1  - amoxicillin (AMOXIL) 875 MG tablet; Take 1 tablet (875 mg total) by mouth 2 (two) times a day for 10 days. Take with food.  Dispense: 20 tablet; Refill: 0    Fluid, rest, steam, nasal saline if needed  Amoxicillin twice a day for 10 days  Albuterol inhaler 2 puffs every 4 hours as needed for cough  Follow up as needed    Subjective:      Shahbaz Nam is a 32 y.o. female who presents with cough and congestion.   used.  For 2 weeks she has had cough, fatigue, some shortness of breath.  No fever.  No definite wheezing but possible history of asthma - has used inhalers before with colds but doesn't have one now.  Has H/O TB and is s/p lung resection.  Cough is mainly dry.  Some URI vs spring allergies with runny nose and congestion as well.  ST and occasional HA.  No fever.  Claritin helps.  NKDA    Current Outpatient Prescriptions on File Prior to Visit   Medication Sig Dispense Refill     albuterol (PROVENTIL HFA;VENTOLIN HFA) 90 mcg/actuation inhaler Inhale 2 puffs every 6 (six) hours as needed for wheezing. 8.5 g 11     cholecalciferol, vitamin D3, (VITAMIN D3) 2,000 unit Tab Take 1 tablet (2,000 Units total) by mouth daily. 100 tablet 3     etonogestrel (NEXPLANON) 68 mg Impl implant Insert 1 device   Lot [id number] Exp 11/2016       ferrous sulfate 325 (65 FE) MG tablet One daily with food 90 tablet 3     loratadine (CLARITIN) 10 mg tablet One by mouth daily as needed for allergy sx's 30 tablet 2     naproxen (NAPROSYN) 375 MG tablet Take 1 tablet (375 mg total) by mouth 2 (two) times a day with meals. 40 tablet 0     prenatal multivit-Ca-min-Fe-FA (PRENATAL VITAMIN) Tab Take 1 tablet by mouth once daily.       No current  facility-administered medications on file prior to visit.      Patient Active Problem List   Diagnosis     Vitamin D Deficiency     Scoliosis     Amenorrhea     Restrictive Lung Disease       Objective:     /66  Pulse 75  Temp 98.4  F (36.9  C) (Oral)   Resp 20  Wt (!) 82 lb 12.8 oz (37.6 kg)  SpO2 98%  BMI 18.08 kg/m2    Physical  General Appearance: Alert, cooperative, no distress  Head: Normocephalic, without obvious abnormality, atraumatic  Eyes: Conjunctivae are normal.  Ears: Normal TM' and external ear canals, both ears  Nose:  Congestion with swollen turbinates, sinuses nontender  Throat: Throat is red posteriorly.  No exudate.  No significant lesions  Neck: No adenopathy  Lungs: Clear to auscultation bilaterally after few initial rhonchi cleared with cough .  Deep breaths trigger cough and there is a prolonged exp phase, respirations unlabored  Heart: Regular rate and rhythm  Skin:  no rashes or lesions  Psychiatric: Patient has a normal mood and affect.

## 2021-06-10 NOTE — PROGRESS NOTES
"S:  Shahbaz Nam is a 35 y.o. female who comes to the clinic today for  1.  Dizziness and fatigue ongoing x 2 weeks.  Sometimes it is so bad that she will almost fall down.  She denies any possibility of pregnancy, and has a mirena IUD.    She is not eating or drinking as much as normal x 2 weeks.  She is not sleeping well for the past few weeks.  No new changes in the social structure of the family.    She only drinks 1-2 cups of water/day.  She will eat once daily, sometimes twice daily.    She is interested in a pregnancy.      2.  She was seen in the ER for anaphylaxis after eating frog for the first time.  She does have a known turtle allergy.  Since leaving the hospital she has not had any difficulty with hives, swelling, sobr, wheezing, diarrhea or vomiting.  She did feel very weak after her meds at the hospital.    Hospital record was reviewed today.  She was able to  her epipens.        I reviewed the pertinent family, social, surgical, medical history.    Review of systems is negative for tremors, unusual weight loss, diarrhea, unusual hair loss.    O:  BP 98/62 (Patient Site: Left Arm, Patient Position: Sitting, Cuff Size: Adult Small)   Pulse 94   Temp 98.2  F (36.8  C) (Oral)   Ht 4' 10\" (1.473 m)   Wt (!) 82 lb (37.2 kg)   SpO2 98%   BMI 17.14 kg/m    Gen: no acute distress, very thin  Neck:  Supple, no lad, no carotid bruits, no thyromegaly or nodules noted  Heart:  Regular rate and rhythm.  No m/r/g  Lungs: Minimal air sounds on the right.  Clear to auscultation on the left.  Abdomen:  No masses or organomegaly, soft. Non tender.  Non distended  Extremities:  No edema.     Symmetric dtr.  No tremors noted.          Patient Active Problem List   Diagnosis     Vitamin D Deficiency     Scoliosis     Amenorrhea     Restrictive lung disease     Elevated WBC count     Seasonal allergies     SVT (supraventricular tachycardia) (H)     Anemia, chronic disease     Current Outpatient Medications on File " Prior to Visit   Medication Sig Dispense Refill     EPINEPHrine (EPIPEN) 0.3 mg/0.3 mL injection Inject 0.3 mL (0.3 mg total) into the shoulder, thigh, or buttocks as needed for anaphylaxis. Inject into thigh. 1 Pre-filled Pen Syringe 0     levonorgestrel (MIRENA) 20 mcg/24 hours (5 yrs) 52 mg IUD 1 each by Intrauterine route once. [facility] at [facility]       acetaminophen (TYLENOL) 325 MG tablet Take 2 tablets (650 mg total) by mouth every 6 (six) hours as needed for pain. 120 tablet 12     benzonatate (TESSALON) 200 MG capsule Take 1 capsule (200 mg total) by mouth 3 (three) times a day as needed for cough. 30 capsule 0     docusate sodium (COLACE) 100 MG capsule Take 1 capsule (100 mg total) by mouth 2 (two) times a day. 60 capsule 11     ibuprofen (ADVIL,MOTRIN) 600 MG tablet Take 1 tablet (600 mg total) by mouth every 8 (eight) hours as needed for pain. 30 tablet 0     multivitamin with minerals tablet Take 1 tablet by mouth daily. 120 tablet 2     No current facility-administered medications on file prior to visit.           No results found for this or any previous visit (from the past 48 hour(s)).     No images are attached to the encounter or orders placed in the encounter.       Assessment/Plan:  1. Insomnia, unspecified type  reivewed use of mirtazapine.  I did encourage her to eat 4 times a daily and very small amounts.  If any tachycardia, cp, would need to be seen and have an ekg at that time.    - mirtazapine (REMERON) 7.5 MG tablet; Take 1 tablet (7.5 mg total) by mouth at bedtime.  Dispense: 90 tablet; Refill: 4    2. Poor appetite  Whether or not she is hungry.  I encouraged her to sip water all through the day to prevent dehydration.  We talked about a goal of 5 to 6 cups of water in a day.  I did let her know that I thought that her dizziness was likely due to being underweight and not having enough liquid.    - mirtazapine (REMERON) 7.5 MG tablet; Take 1 tablet (7.5 mg total) by mouth at  bedtime.  Dispense: 90 tablet; Refill: 4  - Thyroid [place]    3. Underweight  Reviewed that prior to getting pregnant I would like her to improve her eating.      - mirtazapine (REMERON) 7.5 MG tablet; Take 1 tablet (7.5 mg total) by mouth at bedtime.  Dispense: 90 tablet; Refill: 4  - Thyroid [place]    4. Pre-conception counseling  Reviewed that mirtazapine is not compatible with pregnancy.  With this information she would like to wait and work on appetite and feeling better before removing her IUD.    She will start a pnv.    She will return in 1 month for a follow up to see how she is doing at that time.  If she is feeling better, remove IUD at that time.    I did review that a pregnancy at 35 with her various health problems, would be high risk.    - prenatal vit no.130-iron-folic (PRENATAL VITAMIN) 27 mg iron- 800 mcg Tab tablet; Take 1 tablet by mouth daily.  Dispense: 90 tablet; Refill: 3      The entire conversation today was conducted through the use of a professional .      Kalyani Fuentes   8/11/2020 2:27 PM

## 2021-06-11 NOTE — TELEPHONE ENCOUNTER
Support staff to please call patient through a [employer]  and relay the followin. Your Urine Culture has grown proteus mirabilis, which is resistant to the antibiotic that you were prescribed on 2020.  2. You should discontinue nitrofurantoin at this time.  3. A new prescription for ciprofloxacin has been sent to [facility] to complete treatment for your bladder infection. This medication should be taken two times a day for 3 days. Please be sure to complete the full course of this antibiotic as directed.   4. Please return to [facility] or see Dr. Fuentes if your urinary symptoms persist, worsen, or recur.     John Calhoun MD 20 8:12 AM

## 2021-06-11 NOTE — PROGRESS NOTES
Chief Complaint   Patient presents with     Hematuria     blood in urine, pain when urinating since yesterday       ASSESSMENT & PLAN:   Diagnoses and all orders for this visit:    Gross hematuria  -     Urinalysis-UC if Indicated  -     Culture, Urine    Urinary tract infection in female  -     nitrofurantoin, macrocrystal-monohydrate, (MACROBID) 100 MG capsule; Take 1 capsule (100 mg total) by mouth 2 (two) times a day for 5 days.  Dispense: 10 capsule; Refill: 0        MDM:  Patient with gross hematuria and classic UTI symptoms.  UA with minimal evidence of infection.  Will treat with nitrofurantoin.  Should follow-up in 3 days if not better, hematuria still present.     Supportive care discussed.  See discharge instructions below for specific recommendations given.    At the end of the encounter, I discussed results, diagnosis, medications. Discussed red flags for immediate return to clinic/ER, as well as indications for follow up if no improvement. Patient and/or caregiver understood and agreed to plan. Patient was stable for discharge.    SUBJECTIVE    HPI:  HPI  Shahbaz Nam presents to the walk-in clinic with   Chief Complaint   Patient presents with     Hematuria     blood in urine, pain when urinating since yesterday     Has IUD.      Pain with urination, gross hematuria, urgency and frequency.     Symptoms started: Yesterday    Denies: Pelvic pain, vaginal itching or discharge.    See ROS for additional symptoms and/or pertinent negatives.       History obtained from the patient.    Past Medical History:   Diagnosis Date     Abnormal CXR (chest x-ray) 12/2017    right chest volume loss     Anemia, chronic disease 1/9/2020     Scoliosis      SVT (supraventricular tachycardia) (H)      Tuberculosis     had pulmonary TB in 2001, received 4 drug regimen.        Active Ambulatory (Non-Hospital) Problems    Diagnosis     Anemia, chronic disease     SVT (supraventricular tachycardia) (H)     Seasonal allergies      Elevated WBC count     Vitamin D Deficiency     Scoliosis     Amenorrhea     Restrictive lung disease       Family History   Problem Relation Age of Onset     No Medical Problems Mother      Tuberculosis Father      No Medical Problems Child      No Medical Problems Child      No Medical Problems Child        Social History     Tobacco Use     Smoking status: Never Smoker     Smokeless tobacco: Never Used   Substance Use Topics     Alcohol use: No       Review of Systems   Constitutional: Negative for chills and fever.   Genitourinary: Positive for dysuria, hematuria and urgency. Negative for flank pain.       OBJECTIVE    Vitals:    09/05/20 1259   BP: 101/71   Patient Site: Right Arm   Patient Position: Sitting   Cuff Size: Adult Regular   Pulse: (!) 104   Resp: 16   Temp: 98.4  F (36.9  C)   TempSrc: Oral   SpO2: 98%   Weight: (!) 84 lb 14.4 oz (38.5 kg)       Physical Exam  Constitutional:       General: She is not in acute distress.     Appearance: She is well-developed.   HENT:      Right Ear: External ear normal.      Left Ear: External ear normal.   Eyes:      General:         Right eye: No discharge.         Left eye: No discharge.      Conjunctiva/sclera: Conjunctivae normal.   Pulmonary:      Effort: Pulmonary effort is normal.   Abdominal:      Tenderness: There is no right CVA tenderness or left CVA tenderness.   Musculoskeletal: Normal range of motion.   Skin:     General: Skin is warm and dry.      Capillary Refill: Capillary refill takes less than 2 seconds.   Neurological:      Mental Status: She is alert and oriented to person, place, and time.   Psychiatric:         Mood and Affect: Mood normal.         Behavior: Behavior normal.         Thought Content: Thought content normal.         Judgment: Judgment normal.         Labs:  Recent Results (from the past 240 hour(s))   Urinalysis-UC if Indicated   Result Value Ref Range    Color, UA Yellow Colorless, Yellow, Straw, Light Yellow    Clarity, UA  Clear Clear    Glucose, UA Negative Negative    Bilirubin, UA Small (!) Negative    Ketones, UA Negative Negative    Specific Gravity, UA 1.025 1.005 - 1.030    Blood, UA Large (!) Negative    pH, UA 6.5 5.0 - 8.0    Protein, UA >=300 mg/dL (!) Negative mg/dL    Urobilinogen, UA 0.2 E.U./dL 0.2 E.U./dL, 1.0 E.U./dL    Nitrite, UA Negative Negative    Leukocytes, UA Small (!) Negative    Bacteria, UA Few (!) None Seen hpf    RBC, UA >100 (!) None Seen, 0-2 hpf    WBC, UA 5-10 (!) None Seen, 0-5 hpf    Squam Epithel, UA 0-5 None Seen, 0-5 lpf         Radiology:    No results found.    PATIENT INSTRUCTIONS:   There are no Patient Instructions on file for this visit.

## 2021-06-11 NOTE — TELEPHONE ENCOUNTER
Called pt through [place] ([place] [id number]). Pt has no voicemail box set up, so was unable to leave message and no alternative phone number, so unable to contact at this time. [facility] staff will try again another time. Okay to relay message if pt calls back.    Attempt #1    Gordy Scott LPN 09/09/2020 0832 a.m.

## 2021-06-11 NOTE — TELEPHONE ENCOUNTER
Call and no answer. Left message for patient to return call regarding results. Callback number provided.    CESAR Fuller  9/10/2020  1:16 PM

## 2021-06-11 NOTE — PROGRESS NOTES
Pulmonary Follow Up Note  7/13/2017      Reason for Follow Up: Acute Cough, history of restrictive lung disease      Problem List:     Patient Active Problem List   Diagnosis     Vitamin D Deficiency     Scoliosis     Amenorrhea     Restrictive Lung Disease       History:     Mrs. 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  She reports that her cough has been present for 1-2 weeks, and she says really only about a week.  She states that it is mostly a nonproductive cough.  She denies any fevers.  She does have a small child who was recently sick, but more GI symptoms.  She has had cough in the past, and it will last 2-3 weeks, but then goes away.  She had an acute bronchitis in April.  She has used albuterol in the past, but does not feel that it helps her much.  She is not sure there was any other reason she was sent to see pulmonary.      Past Medical History:   Diagnosis Date     SVT (supraventricular tachycardia)      Tuberculosis     had pulmonary TB in 2001, received 4 drug regimen.      Past Surgical History:   Procedure Laterality Date     ablation for svt  04/2016     AVNRT ablation       Social History     Social History     Marital status: Single     Spouse name: N/A     Number of children: N/A     Years of education: N/A     Occupational History     Not on file.     Social History Main Topics     Smoking status: Never Smoker     Smokeless tobacco: Never Used     Alcohol use No     Drug use: Not on file     Sexual activity: Yes     Partners: Male     Birth control/ protection: Implant     Other Topics Concern     Not on file     Social History Narrative     Family History   Problem Relation Age of Onset     No Medical Problems Mother      Tuberculosis Father      No Known Allergies    Review of Systems - Negative except what is mentioned in the HPI.        Medications:     Current Outpatient Prescriptions on  File Prior to Visit   Medication Sig Dispense Refill     etonogestrel (NEXPLANON) 68 mg Impl implant Insert 1 device   Lot [id number] Exp 11/2016       [DISCONTINUED] albuterol (PROAIR HFA;PROVENTIL HFA;VENTOLIN HFA) 90 mcg/actuation inhaler Inhale 2 puffs every 4 (four) hours as needed. 18 g 1     [DISCONTINUED] albuterol (PROVENTIL HFA;VENTOLIN HFA) 90 mcg/actuation inhaler Inhale 2 puffs every 6 (six) hours as needed for wheezing. 8.5 g 11     [DISCONTINUED] cholecalciferol, vitamin D3, (VITAMIN D3) 2,000 unit Tab Take 1 tablet (2,000 Units total) by mouth daily. 100 tablet 3     [DISCONTINUED] ferrous sulfate 325 (65 FE) MG tablet One daily with food 90 tablet 3     [DISCONTINUED] loratadine (CLARITIN) 10 mg tablet One by mouth daily as needed for allergy sx's 30 tablet 2     [DISCONTINUED] naproxen (NAPROSYN) 375 MG tablet Take 1 tablet (375 mg total) by mouth 2 (two) times a day with meals. 40 tablet 0     [DISCONTINUED] prenatal multivit-Ca-min-Fe-FA (PRENATAL VITAMIN) Tab Take 1 tablet by mouth once daily.       No current facility-administered medications on file prior to visit.            Exam/Data:   Vitals  Vitals:    07/13/17 1519   BP: 90/58   Pulse: 84   Resp: 20   SpO2: 98%       EXAM:  GEN: Alert and oriented x3, NAD  HEENT: Nares patent, posterior oropharynx clear.  RESPIRATORY: CTAB.  No wheeze or rales  CARDIOVASCULAR: RRR, no m/r/g  GASTROINTESTINAL: Round, soft, NT/ND  HEM/ONC: no adenopathy, no ecchymosis  MSK: no clubbing.  Ambulates normally  NEURO: cranial nerves appear grossly intact, muscle strength equal  SKIN: no rash or ulcerations      DATA      PFT DATA:  See scanned document.      Mild to moderate restrictive ventilatory defect, with mildly reduced diffusing capacity.    IMAGING:     No imaging for review       Assessment/Plan:   Shahbaz Nam is a 32 y.o. female with history of MTB s/p lung resection, now with acute cough, history of restrictive ventilatory defect.    1. Acute Cough:   Seems consistent with viral bronchitis.  I did offer her albuterol and ibuprofen.  No further work up needed at this time.  I will have her return in 6 weeks for reevaluation.    2. History of restrictive ventilatory defect:  At the time of the visit, it was not clear what the reason for reevaluation was.  After review of the chart, it seems that it was more to reevaluate her restriction that was seen on PFTs in 2016.  She was pregnant at that time.  Due to her cough, it would be of limited benefit to repeat her PFTs at this time.  When she comes back to reevaluate, we can obtain PFTs at that time.    Recommend:  - supportive care for cough  - PFTs at next visit      Evin Vale DO

## 2021-06-11 NOTE — TELEPHONE ENCOUNTER
Called patient with  on the line and left message for patient to return call regarding results. Callback number provided.    CESAR Fuller  9/14/2020  11:01 AM

## 2021-06-11 NOTE — TELEPHONE ENCOUNTER
Call and spoke with patient with  on the line and message was given. Questions were answered. No furthers questions.    CESAR Fuller  9/15/2020  2:52 PM

## 2021-06-11 NOTE — PROGRESS NOTES
S:  32-year-old female who comes in today for a  ship waiver.  She describes significant problems with learning, attention, memory.  She does describe a history of head trauma per her mother at 8 months old when she fell.  At that time she had bruises on her body and it is reported that she lost consciousness for 1 day.  She also complains of difficulty with memory attention and concentration due to nightmares.  She has periods of time where she will hear loud noises or remember things or have dreams which result in significant palpitations and bad nightmares.  This is been going on since she saw someone killed directly in front of her.  She has never been on any treatment for this.  Ever since these occurrences happen she has had very difficult times learning, paying attention to things, and remembering things.  She only completed third grade in a [employer].  She is able to read and write a little bit in [employer].  She says that math was always very difficult for her and to this day she has difficulty with math.  She has not gone to school here in the [place].  She has never really managed money though it is difficult to ascertain whether or not she just was not allowed to manage money or whether or not she was unable.  She herself is unable to differentiate this.  She is able to navigate the area around her house on her own direction wise.  She does not get lost.  She is able to do all her own ADLs in the home.  She has difficulty managing medications because she cannot remember to take them.  She needs reminders to take her own medications.  It is unclear whether or not she is able to dose them correctly.  She denies any subsequent head trauma.  She denies any subsequent loss of consciousness.  She does describe a period of time when she is about [age] when she had a lot of emotional lability and also did not want to wear closed.  She says her mother told her about this  "event.  She has not had any significant other events since that time.  She does describe ongoing shortness of breath.  No current fevers.  She is off of all of her inhalers.  She has no shortness of breath when she is at rest but does have shortness of breath with walking for more than 10 minutes.  No chest pain.  She does have intermittent dizziness that does not appear to be associated with any particular activity.  O:  BP 90/62  Pulse 62  Temp 97.8  F (36.6  C) (Oral)   Resp 20  Ht 4' 8.75\" (1.441 m)  Wt (!) 80 lb (36.3 kg)  SpO2 98%  BMI 17.46 kg/m2  Gen:  Nad, alert  Psych: Appropriate mood and affect.  Good eye contact.  Fluent speech.  On a modified Mini-Mental Status exam she is oriented to person and place.  She does not know who the president is.  She is oriented to the date.  She is able to recall 3 objects immediately and recalls 2 out of 3 at 5 minutes.  She is able to follow instructions to write her name on a piece of paper and place it on the ground.  She is able to write her birth date.  She has difficulty spacing the numbers and drying the face of a clock but does get all the numbers correct.  Due to language barriers and poor math skills I am unable to check other aspects of the Mini-Mental status exam.  Heart regular rate and rhythm.  No murmurs, rubs, gallops.  Lungs: Diminished lung sounds over the right.  Lungs are clear to auscultation over the left.  No thyromegaly or nodules are noted.  No edema is noted in her bilateral lower extremities.  Deep tendon tendon reflexes are symmetric bilaterally.    Patient Active Problem List   Diagnosis     Vitamin D Deficiency     Scoliosis     Amenorrhea     Restrictive Lung Disease     Current Outpatient Prescriptions on File Prior to Visit   Medication Sig Dispense Refill     etonogestrel (NEXPLANON) 68 mg Impl implant Insert 1 device   Lot [id number] Exp 11/2016       albuterol (PROVENTIL HFA;VENTOLIN HFA) 90 mcg/actuation inhaler Inhale 2 " puffs every 6 (six) hours as needed for wheezing. 8.5 g 11     cholecalciferol, vitamin D3, (VITAMIN D3) 2,000 unit Tab Take 1 tablet (2,000 Units total) by mouth daily. 100 tablet 3     ferrous sulfate 325 (65 FE) MG tablet One daily with food 90 tablet 3     naproxen (NAPROSYN) 375 MG tablet Take 1 tablet (375 mg total) by mouth 2 (two) times a day with meals. 40 tablet 0     prenatal multivit-Ca-min-Fe-FA (PRENATAL VITAMIN) Tab Take 1 tablet by mouth once daily.       [DISCONTINUED] albuterol (PROAIR HFA;PROVENTIL HFA;VENTOLIN HFA) 90 mcg/actuation inhaler Inhale 2 puffs every 4 (four) hours as needed. 18 g 1     [DISCONTINUED] loratadine (CLARITIN) 10 mg tablet One by mouth daily as needed for allergy sx's 30 tablet 2     No current facility-administered medications on file prior to visit.           No results found for this or any previous visit (from the past 48 hour(s)).      Assessment/Plan:  1. Restrictive Lung Disease  Will refill inhalers.    Refer backto pulmonology nowthat she is no longer pregnant.    - Ambulatory referral to Pulmonology    2. Allergic rhinitis, seasonal    - loratadine (CLARITIN) 10 mg tablet; One by mouth daily as needed for allergy sx's  Dispense: 30 tablet; Refill: 2    3. Bronchitis, acute    - albuterol (PROAIR HFA;PROVENTIL HFA;VENTOLIN HFA) 90 mcg/actuation inhaler; Inhale 2 puffs every 4 (four) hours as needed.  Dispense: 18 g; Refill: 1    4. PTSD (post-traumatic stress disorder)  Will continue to monitor.    Cannot use prazosin at this time due to low pressures.      5. History of traumatic head injury      Citizenship nayeli filled out for patient today.  She was educated on the process of this.    The entire conversation today was conducted through the use of a professional .  Greater than 40 minutes was spent with this patient, with over half in education.              Kalyanilaila Fuentes   6/9/2017 10:50 AM

## 2021-06-11 NOTE — PROGRESS NOTES
"HPI - 34yo female here with her  here for IUD removal.   [employer] professional phone  used.        - wants another child  Children 14yo, 9yo, 5yo  IUD inserted 2/15/2018 at [facility]    Pap/HPV neg 2018    Patient Active Problem List   Diagnosis     Vitamin D Deficiency     Scoliosis     Amenorrhea     Restrictive lung disease     Elevated WBC count     Seasonal allergies     SVT (supraventricular tachycardia) (H)     Anemia, chronic disease     Current Outpatient Medications   Medication Sig Note     acetaminophen (TYLENOL) 325 MG tablet Take 2 tablets (650 mg total) by mouth every 6 (six) hours as needed for pain.      levonorgestrel (MIRENA) 20 mcg/24 hours (5 yrs) 52 mg IUD 1 each by Intrauterine route once. [facility]      EPINEPHrine (EPIPEN) 0.3 mg/0.3 mL injection Inject 0.3 mL (0.3 mg total) into the shoulder, thigh, or buttocks as needed for anaphylaxis. Inject into thigh. 2020: PRN      mirtazapine (REMERON) 7.5 MG tablet Take 1 tablet (7.5 mg total) by mouth at bedtime.      multivitamin with minerals tablet Take 1 tablet by mouth daily.      prenatal vit no.130-iron-folic (PRENATAL VITAMIN) 27 mg iron- 800 mcg Tab tablet Take 1 tablet by mouth daily.      Vitals:    20 1328   BP: 90/58   Pulse: 88   Resp: 14   Temp: 98.9  F (37.2  C)   TempSrc: Oral   SpO2: 97%   Weight: (!) 86 lb 12.8 oz (39.4 kg)   Height: 4' 9.32\" (1.456 m)     PHYSICAL EXAM   General Appearance: Awake and alert, in no acute distress  HEENT: neck is supple  CV: regular rate  Resp: No respiratory distress. Breathing comfortably  Musculoskeletal: moving limbs comfortably with not deficits or deformities  Skin: no rashes noted  Pelvic exam:   External genitalia: normal  Vaginal discharge: none  Cervix: no inflammation, discharge, bleeding or lesions noted.      A/P  D removed - Procedure discussed and verbal consent given.   IUD strings easily visualized from cervical os and removed with " ring forceps without complications.   Taking PNV  Wanting another child    Vaginal discharge noted on exam -   Check wet prep and gc/ct    Flu shot

## 2021-06-12 NOTE — PROGRESS NOTES
SUBJECTIVE: seen with phone interpretor.   3-4 days of burning when she urinates. No abd pain but occ nauseated without vomting. Feels a bit fatigued. No fever.     No vaginal discharge, no sti risks.     Could be pregnant. IUD removed about 1 month ago.     No frequency nor weight loss.     OBJECTIVE: /79 (Patient Site: Right Arm, Patient Position: Sitting, Cuff Size: Adult Regular)   Pulse (!) 118   Temp 98.4  F (36.9  C) (Oral)   Resp 16   LMP 10/08/2020   SpO2 98%    no apparent distress     Results for orders placed or performed in visit on 10/23/20   Urinalysis-UC if Indicated   Result Value Ref Range    Color, UA Yellow Colorless, Yellow, Straw, Light Yellow    Clarity, UA Cloudy (!) Clear    Glucose,  mg/dL (!) Negative    Bilirubin, UA Negative Negative    Ketones, UA Negative Negative    Specific Gravity, UA 1.020 1.005 - 1.030    Blood, UA Moderate (!) Negative    pH, UA 6.0 5.0 - 8.0    Protein, UA Trace (!) Negative mg/dL    Urobilinogen, UA 0.2 E.U./dL 0.2 E.U./dL, 1.0 E.U./dL    Nitrite, UA Negative Negative    Leukocytes, UA Large (!) Negative    Bacteria, UA Moderate (!) None Seen hpf    RBC, UA 10-25 (!) None Seen, 0-2 hpf    WBC, UA >100 (!) None Seen, 0-5 hpf    Squam Epithel, UA 10-25 (!) None Seen, 0-5 lpf    WBC Clumps Present (!) None Seen   Pregnancy (Beta-hCG, Qual), Urine   Result Value Ref Range    Pregnancy Test, Urine Negative Negative        ICD-10-CM    1. Dysuria  R30.0 Urinalysis-UC if Indicated     Pregnancy (Beta-hCG, Qual), Urine     Culture, Urine   2. Urinary tract infection without hematuria, site unspecified  N39.0 cephalexin (KEFLEX) 500 MG capsule    uti noted. No sign pyelo. No sign dehydration except for mildly elevated pulse. glucose noted in the urine. recommended follow up with her primary clinic for serum glucose.

## 2021-06-12 NOTE — PROGRESS NOTES
Assessment: /    Plan:    1. Right-sided low back pain without sciatica, unspecified chronicity     2. Dysuria  Urinalysis-UC if Indicated       Avoid bending at the waist.    Recheck if any problem.    Patient was seen with professional Sharon telephone , Felipe Wilder.      Subjective:    HPI:  Shahbaz Nam is a 35-year-old female presenting with right low back pain.  This has been occurring for 2 months.  It is painful when she twists her torso or bends at the waist.  She noted blood in the urine 1 month ago.  IUD was removed September 22.       Review of Systems: No fever, nausea, vomiting, weakness of the legs.      Current Outpatient Medications   Medication Sig Dispense Refill     acetaminophen (TYLENOL) 325 MG tablet Take 2 tablets (650 mg total) by mouth every 6 (six) hours as needed for pain. 120 tablet 12     EPINEPHrine (EPIPEN) 0.3 mg/0.3 mL injection Inject 0.3 mL (0.3 mg total) into the shoulder, thigh, or buttocks as needed for anaphylaxis. Inject into thigh. 1 Pre-filled Pen Syringe 0     levonorgestrel (MIRENA) 20 mcg/24 hours (5 yrs) 52 mg IUD 1 each by Intrauterine route once. Inserted at [facility]       mirtazapine (REMERON) 7.5 MG tablet Take 1 tablet (7.5 mg total) by mouth at bedtime. 90 tablet 4     multivitamin with minerals tablet Take 1 tablet by mouth daily. 120 tablet 2     prenatal vit no.130-iron-folic (PRENATAL VITAMIN) 27 mg iron- 800 mcg Tab tablet Take 1 tablet by mouth daily. 90 tablet 3     No current facility-administered medications for this visit.          Objective:    Vitals:    10/13/20 0926   BP: 90/60   Pulse: 72   Resp: 20   Temp: 98.4  F (36.9  C)   SpO2: 98%       Gen:  NAD, VSS  Lungs:  normal  Heart:  normal  Abdomen:  No HSM, mass or tenderness  Back without CVA tenderness.  No midline thoracic or lumbar spinal tenderness.  Right lumbar paraspinal tenderness.  Straight leg raise normal bilateral.    UA with squamous epithelial cells    ADDITIONAL HISTORY  SUMMARIZED (2): None.  DECISION TO OBTAIN EXTRA INFORMATION (1): None.   RADIOLOGY TESTS (1): None.  LABS (1): Urine.  MEDICINE TESTS (1): None.  INDEPENDENT REVIEW (2 each): None.     Total Data Points: 1

## 2021-06-13 NOTE — PROGRESS NOTES
"S:  32-year-old female who comes in today accompanied by her  for updates of her citizenship waiver.  She received some paperwork from her  indicating that perhaps some changes needed to be made to her citizenship waiver.  She has not changed her health history since she was here last.  O:  BP (!) 86/56  Pulse 75  Temp 97.8  F (36.6  C) (Oral)   Resp 20  Ht 4' 8.75\" (1.441 m)  Wt (!) 81 lb (36.7 kg)  SpO2 98%  BMI 17.68 kg/m2  Gen:  Nad, alert  Able to follow directions to sign her paperwork without difficulty.  Answers all questions without difficulty.      Patient Active Problem List   Diagnosis     Vitamin D Deficiency     Scoliosis     Amenorrhea     Restrictive Lung Disease     Current Outpatient Prescriptions on File Prior to Visit   Medication Sig Dispense Refill     albuterol (PROAIR HFA;PROVENTIL HFA;VENTOLIN HFA) 90 mcg/actuation inhaler Inhale 2 puffs every 4 (four) hours as needed. 1 Inhaler 3     etonogestrel (NEXPLANON) 68 mg Impl implant Insert 1 device   Lot [id number] Exp 11/2016       ibuprofen (ADVIL,MOTRIN) 600 MG tablet Take 1 tablet (600 mg total) by mouth every 6 (six) hours as needed for pain (cough). 56 tablet 0     No current facility-administered medications on file prior to visit.           No results found for this or any previous visit (from the past 48 hour(s)).      Assessment/Plan:  1. PTSD (post-traumatic stress disorder)  Refer to neuropsych testing.    - Ambulatory referral to Psychology    2. Lice    - piper.but-pyrethrins-permethrn (RID COMPLETE LICE ELIM KIT) 4-0.33-0.5 % Kit; Use as directed to eliminate lice  Dispense: 10 each; Refill: 2    3. Restrictive Lung Disease  Influenza vaccine given today.    - Pneumococcal conjugate vaccine 13-valent 6wks-17yrs; >50yrs    Follow up prn.  The entire conversation today was conducted through the use of a professional .        Kalyani Fuentes   9/19/2017 4:40 PM         "

## 2021-06-14 NOTE — PROGRESS NOTES
Walk-In Clinic Note    SUBJECTIVE:     Shahbaz Nam is a 32 y.o. female who presents today with her  Natacha Raphael (also being seen as a patient) for evaluation of weakness, fatigue, and a cough.     She reports that for the past month she has felt weak and tired. This is at all times during the day. She thinks she is losing all of her strength. 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 She denies any fevers or chills. She does note a runny nose and also some intermittent diffuse headaches, but these are not all that bad. Cough is nonproductive. No emesis.    Seen at [facility] at 12/5 and prescribed clarithromycin x 10 days for PNA vs URI. Has been compliant with the medication. Feels her fatigue has worsened since she was at [facility].    She thinks her symptoms are due to a hole in her heart.     Two years ago had PNA and feels that this is similar.     Allergies:   No Known Allergies    OBJECTIVE:   Vitals:    12/09/17 1333   BP: 98/60   Patient Site: Right Arm   Patient Position: Sitting   Cuff Size: Adult Regular   Pulse: 91   Resp: 18   Temp: 98.3  F (36.8  C)   TempSrc: Oral   SpO2: 97%   Weight: (!) 82 lb 9.6 oz (37.5 kg)      General: thin female in NAD  Eyes: Pupils equal and reactive to light. EOMs intact. No conjunctival injection.  ENT: Mucous membranes moist and pink. No tonsilar hypertrophy, erythema or exudate. Bilateral TMs pearly grey with clear canals. Nares clear.  Neck: Supple, no lymphadenopathy, full ROM.  Pulmonary: Clear to ausculation throughout with good air movement, no crackles or wheezing.  Cardiac: Regular rate and rhythm, no murmur  Abdomen: Active bowel sounds. Soft, non-tender. No peritoneal signs.  Musculoskeletal: Mild TTP over her R scapula  Skin: Color is normal. No rashes or abnormal lesions.    Echo 8/17/16:  Contrast:                    Contrast Allergy:      Clinical Indications:SOB         CONCLUSION:    Limited echo performed. LVEF 65%. Technically difficult study.    1) LVEF  65%. Normal LV size and systolic function. No gross     regional wall motion abnormalities identified.     2) Normal RV size and function.     3) Trace aortic regurgitation.    4) IVC is small in size and responsive to inspiration indicating normal     or low RA pressure.     5) Compared to prior study in 5/2016, filling pressures are low on this     study.      Left Ventricular Ejection Fraction: 65 %     ASSESSMENT AND PLAN:   Labs reviewed from [facility]  visit 12/5/17, with a mild leukocytosis seen at that time. TSH was normal as was BMP.   Given normal vital signs today and normal lung exam do not think repeat imaging or bloodwork is indicated. For now seems reasonable to continue her antibiotics.    I did not seen anything to explain her headache or her rhinorrhea. Could certainly be allergies. No sinus tenderness to correlate with sinusitis.     I do not see any clear evidence on exam or on her recent labs / CXR to explain her fatigue at this time. This could be continued viral infection.     Reviewed patient's echo from last year and how there is not any evidence of a septal defect with the patient. This was reassuring to her. She may be referring to an emotional hole in her heart, we were using a phone  and while she said her mood was fine I am not sure if this was being translated accurately.     Will treat the pain on her back (and her headaches) with motrin 600mg PRN.     Asked her to f/u at the [facility] within the next 1-2 weeks to re-evaluate. Ideally with an in-person  will be able to obtain a better history.     Aflonso Wells MD   pager: [phone]

## 2021-06-14 NOTE — PROGRESS NOTES
"  KARTHIKEYAN Nam is a 32 y.o. female here for Nexplanon removal. It was placed in 11/2014. She does not want another one because she had lots of irregular bleeding with the Nexplanon.  Sometimes she would have her period for a month.  She never notified any of the doctors here about this problem.  I did explain that with irregular bleeding on Nexplanon, we can do a short course of estrogen or birth control pills, and this usually resolves the bleeding.  She still does not want another Nexplanon placed.  She is also not interested in IUD.  She would like to do Depo.  Probably does not want any more children but is not completely sure yet.  Past Medical History:   Diagnosis Date     SVT (supraventricular tachycardia)      Tuberculosis     had pulmonary TB in 2001, received 4 drug regimen.      Current Outpatient Prescriptions on File Prior to Visit   Medication Sig Dispense Refill     albuterol (PROAIR HFA;PROVENTIL HFA;VENTOLIN HFA) 90 mcg/actuation inhaler Inhale 2 puffs every 4 (four) hours as needed. 1 Inhaler 3     etonogestrel (NEXPLANON) 68 mg Impl implant Insert 1 device   Lot [id number] Exp 11/2016       ibuprofen (ADVIL,MOTRIN) 600 MG tablet Take 1 tablet (600 mg total) by mouth every 6 (six) hours as needed for pain (cough). 56 tablet 0     [DISCONTINUED] piper.but-pyrethrins-permethrn (RID COMPLETE LICE ELIM KIT) 4-0.33-0.5 % Kit Use as directed to eliminate lice 10 each 2     No current facility-administered medications on file prior to visit.      Social Hx: does not smoke. She does have calcium in her diet in the form of dairy products.     Past medical, family and social history reviewed with no changes.   ?  ROS:   General: No fevers, chills  : Started bleeding 4 days ago.  No unusual discharge.  MS: No arthralgias or myalgias  Psych: No significant change in mood, anxiety level    ?  O  BP (!) 86/66  Pulse 68  Temp 98.3  F (36.8  C) (Oral)   Resp 16  Ht 4' 8.75\" (1.441 m)  Wt (!) 79 lb 12 oz " (36.2 kg)  LMP 11/04/2017 (Exact Date)  Breastfeeding? No  BMI 17.41 kg/m2   Vitals reviewed. Nursing note reviewed.  General Appearance: Pleasant and alert, in no acute distress  HEENT:mucous membranes slightly dry.   Ext:  good distal perfusion  Skin: warm, dry, intact, no rash noted  Neuro: no focal deficits, CNs II-XII normal.   A/P  Ta was seen today for contraception.    Diagnoses and all orders for this visit:    Encounter for Nexplanon removal: After cleansing right arm above the elbow, 1 mL of 1% Lidocaine was administered along the planned injection site for Nexplanon. Insertion site cleansed with iodine. 0.25 cm incision was made at tip of Nexplanon with 11 blade scalpel. The tip was visualized and was grasped with pickups. Nexplanon was easily removed.     Bleeding was minimal and a pressure dressing was applied with instructions to leave this in place for 24 hours. Pt was instructed to observe for signs/symptoms of any infection (localized tenderness, pain, inflammation) or excessive bruising.  No apparent distress at completion of procedure. No complications.      Contraception: Advised including calcium in diet, to prevent bone thinning.  -     medroxyPROGESTERone injection 150 mg (DEPO-PROVERA); Inject 1 mL (150 mg total) into the shoulder, thigh, or buttocks once.    Visit completed along with assistance of Sharon .  Options for treatment and follow-up care were reviewed with the patient and/or guardian. Ta Cyril and/or guardian engaged in the decision making process and verbalized understanding of the options discussed and agreed with the final plan.    Jacqueline Monteiro MD

## 2021-06-14 NOTE — PROGRESS NOTES
OFFICE VISIT NOTE      Assessment & Plan   Shahbaz Nam is a 32 y.o. female with scoliosis and lost volume on her right chest here with what looks like an early pneumonia, bronchitis or URI. She has good reason for occasional shortness of breath, but since she's worse than usual x 2 weeks, I'm concerned. Even though her WBC was previously elevated and the radiologist does not see a new infection on the CXR, I am going to treat her with clarithromycin x 10 days to try to help. She'll continue her albuterol inhaler but with a spacer OR albuterol neb.  F/u if she does not improve --in which case an EKG would be appropriate.      There are no diagnoses linked to this encounter.    Diamond Gaitan MD              Subjective:   Chief Complaint:  Cough (2 weeks); Headache (2 weeks); Fatigue (2 weeks); and Sore Throat (2 weeks)    32 y.o. female.     1) cough - sometimes productive, headache, tired, weak x 2 weeks  Some relief from tylenol  Has chills, no fever that she's aware of.  No SOB but she gets relief from her inhaler prn.  Sometimes she's laying in bed resting but her heart is pounding - no feelings of anxiety or panic at that time.      Current Outpatient Prescriptions   Medication Sig Note     albuterol (PROAIR HFA;PROVENTIL HFA;VENTOLIN HFA) 90 mcg/actuation inhaler Inhale 2 puffs every 4 (four) hours as needed.      etonogestrel (NEXPLANON) 68 mg Impl implant Insert 1 device   Lot [id number] Exp 11/2016      ibuprofen (ADVIL,MOTRIN) 600 MG tablet Take 1 tablet (600 mg total) by mouth every 6 (six) hours as needed for pain (cough).      loratadine (CLARITIN) 10 mg tablet  11/8/2017: Received from: External Pharmacy       PSFHx: appropriate sections of PMH completed/filled in   Tobacco Status:  She  reports that she has never smoked. She has never used smokeless tobacco.    Review of Systems:  No fever.  No rash.    Objective:    /60 (Cuff Size: Adult Regular)  Pulse 91  Temp 98.9  F (37.2  C) (Oral)   Wt  (!) 81 lb (36.7 kg)  SpO2 98%  BMI 17.68 kg/m2  GENERAL: No acute distress.  HEENT:  Eyes clear; TMs clear; throat with erythema but little swelling and no exudate  NECK: supple, moderate bilateral LA which was tender  LUNGS: aeration fair, right side with slight wheezes and rales consistently; left same but less of everything; after an albuterol neb there was better aeration and no wheeze; rales cleared on the left.  Ht: reg s1s2  Back: curved    chest X-ray shows marked volume loss on the right (due to lung resection from TB), no change, radiologist says no acute problem    WBC high 14.5    Greater than 60 minutes spent with patient, with greater than 50% of time spent in counseling, consultation and care coordination regarding problems detailed above.

## 2021-06-15 NOTE — PROGRESS NOTES
"OFFICE VISIT NOTE      Assessment & Plan   Shahbaz Nam is a 33 y.o. female with several issues to cover:    Contraception - she wants to get an IUD. We covered the option of Essure, but she was not quite ready once we read through the consent. Refer to OBGYN. She thinks she wants the IUD that lasts 5 years. Gave her a package of 10 condoms.    Underweight - check some labs. Discussed good nutrition, especially getting regular meals each day. If she agrees to gain some weight, she'll have a snack before bed, too.    Anemia - she eats no meat, and therefore agrees to take an iron supplement.    Pap - not due until later this year, but in light of getting an IUD and it being close, she wanted it done.    Elevated WBC - recheck today    Routine health maintenance done today, too.    Diagnoses and all orders for this visit:    Contraception  -     Ambulatory referral to Obstetrics / Gynecology    Routine general medical examination at a health care facility  -     Wet Prep, Vaginal  -     Gynecologic Cytology (PAP Smear)  -     Lipid Cascade  -     HM1(CBC and Differential)  -     Comprehensive Metabolic Panel  -     HM1 (CBC with Diff)    Anemia    Scoliosis    Vitamin D deficiency  -     Vitamin D, Total (25-Hydroxy)    Elevated WBC count  -     HM1(CBC and Differential)  -     HM1 (CBC with Diff)    Underweight  -     Thyroid Stimulating Hormone (TSH)  -     Prolactin    Amenorrhea  -     Prolactin    Other orders  -     ferrous gluconate (FERGON) 324 MG tablet; Take 1 tablet (324 mg total) by mouth daily with breakfast.  Dispense: 90 tablet; Refill: 4        Diamond Gaitan MD    The following low BMI interventions were performed this visit: weight gain advised          Subjective:   Chief Complaint:  Contraception    33 y.o. female.     1) wants the birth control which \"sits in the uterus\"; she declined getting her depo yesterday. She plans to use condoms until the next option can be put in. She had constant bleeding " "with Nexplanon. She had irregular bleeding with Depo. She's pretty sure she wants no more kids.  Discussed 2 IUDs and Essure    2) [facility] called her and said she needed a check of her uterus and she needed to go in a machine - lie down, etc.    3) does not eat much; says they have enough food at home. Sometimes she goes the whole day without eating. Her kids are larger-bodied than she is.        Current Outpatient Prescriptions   Medication Sig Note     albuterol (PROAIR HFA;PROVENTIL HFA;VENTOLIN HFA) 90 mcg/actuation inhaler Inhale 2 puffs every 4 (four) hours as needed.      ferrous gluconate (FERGON) 324 MG tablet Take 1 tablet (324 mg total) by mouth daily with breakfast.      ibuprofen (ADVIL,MOTRIN) 600 MG tablet Take 1 tablet (600 mg total) by mouth every 6 (six) hours as needed for pain.      loratadine (CLARITIN) 10 mg tablet  11/8/2017: Received from: External Pharmacy     ranitidine (ZANTAC) 300 MG tablet Take 1 tablet (300 mg total) by mouth at bedtime.        PSFHx: appropriate sections of PMH completed/filled in   Tobacco Status:  She  reports that she has never smoked. She has never used smokeless tobacco.    Review of Systems:  No fever.  No diarrhea. No heartburn.    Objective:    BP 98/60 (Cuff Size: Adult Regular)  Pulse 70  Temp 98.3  F (36.8  C) (Oral)   Ht 4' 9.75\" (1.467 m)  Wt (!) 79 lb 12 oz (36.2 kg)  SpO2 98%  BMI 16.81 kg/m2  GENERAL: No acute distress.  Mood: good  Judgment: good  Insight: good    : external genitalia normal, little pubic hair; lourdes speculum used, cervix seen, SCJ outside the os; white-yellow fluid by the cervix - sampled for wet prep; pap taken and slight bleeding followed    Pap pending  Wet prep normal    Labs - CBC reveiwed by me  Greater than 55 minutes spent with patient. 30min on her health maintenance and an additional 25 min on the other issues.          "

## 2021-06-15 NOTE — PROGRESS NOTES
Pt arrived with a janet , Dennis Gilliam. There were no documentation for her previous depo done on 11/8/17 so Dr Monteiro ordered a urine pregnancy test. It was negative so I drew the depo up and was about to give it when patient changed her mind. She stated that she would like to change her birth control method because she is having excessive bleeding, headache, and dizziness. I offered her the injection in the mean time while she is waiting for an appt to make sure she is protected, but she declined. She wanted to use condoms so I gave her some. I gave her a passport to schedule an appt with an MD.

## 2021-06-15 NOTE — PROGRESS NOTES
Subjective:      Patient ID: Shahbaz Nam is a 33 y.o. female.    Chief Complaint:    Cough   This is a new (She has had some cough that has been going on for some months now.  Also having some symptoms of reflux with lots of gassiness.  But current symptoms has been going on for the past 2-3 days with fever starting yesterday.  Noted some sore throat .) problem. The current episode started in the past 7 days. The problem occurs constantly. The problem has been gradually worsening. Associated symptoms include abdominal pain, congestion, coughing, fatigue, a fever, headaches, neck pain and a sore throat. Pertinent negatives include no anorexia, chest pain, chills, nausea, rash, urinary symptoms, vertigo or vomiting. The symptoms are aggravated by coughing and exertion. She has tried acetaminophen for the symptoms. The treatment provided mild relief.       The following portions of the patient's history were reviewed and updated as appropriate: allergies, current medications, past family history, past medical history, past social history, past surgical history and problem list.       Past Medical History:   Diagnosis Date     SVT (supraventricular tachycardia)      Tuberculosis     had pulmonary TB in 2001, received 4 drug regimen.        Past Surgical History:   Procedure Laterality Date     ablation for svt  04/2016     AVNRT ablation         Family History   Problem Relation Age of Onset     No Medical Problems Mother      Tuberculosis Father        Social History   Substance Use Topics     Smoking status: Never Smoker     Smokeless tobacco: Never Used     Alcohol use No       Review of Systems   Constitutional: Positive for fatigue and fever. Negative for activity change, appetite change and chills.   HENT: Positive for congestion, rhinorrhea, sinus pressure and sore throat. Negative for ear pain, postnasal drip and sinus pain.    Respiratory: Positive for cough. Negative for chest tightness.    Cardiovascular: Negative  for chest pain.   Gastrointestinal: Positive for abdominal pain. Negative for anorexia, nausea and vomiting.   Musculoskeletal: Positive for neck pain.   Skin: Negative for rash.   Neurological: Positive for headaches. Negative for vertigo and light-headedness.       Objective:     BP 98/56 (Patient Site: Right Arm, Patient Position: Sitting, Cuff Size: Adult Regular)  Pulse (!) 111  Temp 99  F (37.2  C) (Oral)   Resp 18  Wt (!) 82 lb 11.2 oz (37.5 kg)  SpO2 100%  BMI 18.05 kg/m2    Physical Exam   Constitutional: She is oriented to person, place, and time. She appears well-developed and well-nourished. No distress.   HENT:   Head: Normocephalic.   Right Ear: Tympanic membrane normal.   Left Ear: Tympanic membrane normal.   Nose: Rhinorrhea present. Right sinus exhibits no maxillary sinus tenderness and no frontal sinus tenderness. Left sinus exhibits no frontal sinus tenderness.   Mouth/Throat: Oropharyngeal exudate, posterior oropharyngeal edema and posterior oropharyngeal erythema present.   Eyes: Pupils are equal, round, and reactive to light.   Neck: Normal range of motion. Neck supple.   Cardiovascular: Normal rate and regular rhythm.    Pulmonary/Chest: Effort normal and breath sounds normal.   Abdominal: Soft.   Neurological: She is alert and oriented to person, place, and time.       Assessment:     Procedures    1. Strep sore throat  - Rapid Strep A Screen-Throat  - amoxicillin (AMOXIL) 500 MG capsule; Take 1 capsule (500 mg total) by mouth 3 (three) times a day for 10 days.  Dispense: 30 capsule; Refill: 0    2. GERD (gastroesophageal reflux disease)  - ranitidine (ZANTAC) 300 MG tablet; Take 1 tablet (300 mg total) by mouth at bedtime.  Dispense: 14 tablet; Refill: 0      Plan:     She does have positive strep which we treat her with antibiotics at this point.  She will also continue to take Tylenol for pain as well as discomfort and fever.  Advised to increase fluid intake.  supportive care  discussed fully.  She is given a prescription for ranitidine to help with the gassiness as well as reflux she has had.  Advised to follow-up with her primary is noted.

## 2021-06-16 PROBLEM — D63.8 ANEMIA, CHRONIC DISEASE: Status: ACTIVE | Noted: 2020-01-09

## 2021-06-16 PROBLEM — J30.2 SEASONAL ALLERGIES: Status: ACTIVE | Noted: 2018-09-21

## 2021-06-16 PROBLEM — D72.829 ELEVATED WBC COUNT: Status: ACTIVE | Noted: 2017-12-05

## 2021-06-16 PROBLEM — I47.10 SVT (SUPRAVENTRICULAR TACHYCARDIA) (H): Status: ACTIVE | Noted: 2019-07-05

## 2021-06-16 NOTE — PROGRESS NOTES
"SUBJECTIVE  Ta Cyril is a 33 y.o. female here for:    Cold symptoms 2 weeks ago. Had hoarseness of voice and then developed cough for the last 2 weeks. Cough worst at night. Has not tried anything for cough. Has some yellowish mucus. Subjective fevers. Denies night sweats. Denies wheezing. No sick contacts. +Sore throat that is mild but worse with coughing x 3 weeks. She does have allergies and takes zyrtec. She has history of reactive airway, requiring albuterol but does not have inhaler at home. Of note, she has scoliosis and chronic volume loss in right lung. Last CXR 12/2017 and was unchanged. She denies hemoptysis, chest pain, SOB.    ROS  Complete 10 point review of systems negative except as noted above in HPI    Reviewed Past Medical History, Medications, Family History and Social History in Epic and up to date with no new changes.    OBJECTIVE  BP (!) 84/60 (Patient Site: Right Arm, Patient Position: Sitting, Cuff Size: Adult Regular)  Pulse 72  Temp 98.1  F (36.7  C) (Oral)   Resp 16  Ht 4' 9.75\" (1.467 m)  Wt (!) 83 lb (37.6 kg)  LMP 02/22/2018 (Approximate)  SpO2 98%  BMI 17.5 kg/m2     General: Cooperative, pleasant, in no acute distress  HEENT: NCAT, sclera clear, MMM, TM normal bilaterally  Neck: no lymphadenopathy, no masses  CV: RRR, normal S1/S2, no murmur, rubs, gallops  Resp: No respiratory distress. No wheezing. Decreased breath sounds on L- reviewed chart, this appears chronic.   Abd: soft, non-tender, non-distended, BS+  Skin: no rashes      ASSESSMENT/PLAN:   Shahbaz was seen today for cough.    Diagnoses and all orders for this visit:    Post viral cough: 2 weeks duration following viral URI symptoms. 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 No night sweats. Afebrile and vitally stable with no hypoxia. No wheezing. Symptoms are worst " at night, so encouraged to trial humidifier, elevating head of bed. Gave cough syrup and refilled beta-agonist inhaler to try using at nighttime to alleviate symptoms. If cough does not improve in 1 month, encouraged to return and would consider more extensive workup with imaging considering her chronic lung scarring/volume loss and referral back to pulmonology since she was previously following with them for her chronic lung issues.  -     albuterol (PROAIR HFA;PROVENTIL HFA;VENTOLIN HFA) 90 mcg/actuation inhaler; Inhale 2 puffs every 4 (four) hours as needed.  -     guaiFENesin (ROBITUSSIN) 100 mg/5 mL syrup; Take 10 mL (200 mg total) by mouth 3 (three) times a day as needed for cough.      RTC if symptoms do not improve      Visit was completed along with Sharon pennington    Options for treatment and follow-up care were reviewed with the patient. Ta Cyril and/or guardian was engaged and actively involved in the decision making process. Ta Cyril and/or guardian verbalized understanding of the options discussed and was satisfied with the final plan.      Carly Galindo MD

## 2021-06-17 NOTE — PROGRESS NOTES
ASSESSMENT AND PLAN:  1. Sore throat patient complaining of a hoarse throat today.  No infectious contacts noted at home.  Rapid strep test negative cultures pending.  She has no fever, chills, anorexia or headache noted. Rapid Strep A Screen-Throat    Group A Strep, RNA Direct Detection, Throat   2. Post-nasal drip patient is seen Dr. cardenas approximately 3 weeks ago given albuterol and guanfacine cough syrup.  She took the medications intermittently because she noticed no improvement.  She had been given cetirizine by Dr. hong in February and noticed improvement in symptoms with that.  She does suffer from symptoms suggestive of postnasal drip which include nocturnal coughing which is worse when she lies down.  Restarting cetirizine.  If her symptoms persist would consider trying a different antihistamine.          Orders Placed This Encounter   Procedures     Rapid Strep A Screen-Throat     There are no discontinued medications.    CHIEF COMPLAINT:  Chief Complaint   Patient presents with     Cough     for a long time     Nasal Congestion     Sore Throat       HISTORY OF PRESENT ILLNESS:  Shahbaz is a 33 y.o. female presenting with cough and congestion. Shahbaz is present with a Sharon  and . She was seen by Dr. Cardenas on 3/13/2018 for an evaluation of a cough. During that visit, she was prescribed an albuterol inhaler and guaifenesin. She  Has been using the albuterol inhaler often but does not use the guaifenesin because she does not feel like it helps her. Currently, her cough is still present. She notes her cough is worse at night. She does have congestion and nasal itching.  She denies known ill contacts.     REVIEW OF SYSTEMS:   She endorses eye itching.    All other 10 point review of systems are negative.    PFSH:  Pertinent past, family, social and medical history reviewed.     TOBACCO USE:  History   Smoking Status     Never Smoker   Smokeless Tobacco     Never Used       VITALS:  Vitals:     04/10/18 0826   BP: 102/60   Pulse: 84   Resp: 13   Temp: 97.9  F (36.6  C)   TempSrc: Oral   SpO2: 100%   Weight: (!) 80 lb (36.3 kg)     Wt Readings from Last 3 Encounters:   04/10/18 (!) 80 lb (36.3 kg)   03/15/18 (!) 83 lb (37.6 kg)   02/01/18 (!) 79 lb 12 oz (36.2 kg)     Body mass index is 16.87 kg/(m^2).    PHYSICAL EXAM:  General: Alert, cooperative, no distress, appears stated age  Head: Normocephalic, without obvious abnormality, atraumatic  Eyes: PERRL, conjunctiva/cornea clear, EOM's intact, fundi benign, both eyes  Ears: Normal TM's and external ear canals, both ears  Nose: Nares normal, septum midline, mucosa normal, no drainage or sinus tenderness  Throat: Erythema over posterior pharyngeal wall   Musculoskeletal: Back symmetric, no curvature, ROM normal, no CVA tenderness.  Lungs: Clear to auscultation bilaterally, respirations unlabored  Chest wall: No tenderness or deformity  Heart: Regular rate and rhythm, S1 and S2 normal, no murmur, rub, or gallop  CVS: No edema noted.   Neurologic: No tremor, no focal findings.     Psych: Oriented x3. Affect normal.     DATA REVIEWED:  Additional History from Old Records Summarized (2): Reviewed Dr. Galindo's note from 3/15/2018 regarding cough.   Decision to Obtain Records (1): None  Radiology Tests Summarized or Ordered (1): None  Labs Reviewed or Ordered (1): Labs ordered.   Medicine Test Summarized or Ordered (1): None  Independent Review of EKG or X-RAY(2 each): None    The visit lasted a total of 13 minutes face to face with the patient. Over 50% of the time was spent counseling and educating the patient about cough.     IDahlia, am scribing for and in the presence of, Dr. Mayorga.    warren PEREA, personally performed the services described in this documentation, as scribed by Dahlia Zhang in my presence, and it is both accurate and complete.      MEDICATIONS:  Current Outpatient Prescriptions   Medication Sig Dispense Refill      albuterol (PROAIR HFA;PROVENTIL HFA;VENTOLIN HFA) 90 mcg/actuation inhaler Inhale 2 puffs every 4 (four) hours as needed. 1 Inhaler 3     cetirizine (ZYRTEC) 10 MG tablet Take 1 tablet (10 mg total) by mouth daily. 30 tablet 2     ferrous gluconate (FERGON) 324 MG tablet Take 1 tablet (324 mg total) by mouth daily with breakfast. 90 tablet 4     guaiFENesin (ROBITUSSIN) 100 mg/5 mL syrup Take 10 mL (200 mg total) by mouth 3 (three) times a day as needed for cough. 236 mL 0     Current Facility-Administered Medications   Medication Dose Route Frequency Provider Last Rate Last Dose     albuterol nebulizer solution 3 mL (PROVENTIL)  3 mL Nebulization Once Diamond Valladares MD           Total Data Points: 3

## 2021-06-17 NOTE — PROGRESS NOTES
Chief Complaint   Patient presents with     Back Pain     X2 weeks, Lower back pain     Bladder Problem     X2 weeks, bladder pain, feels like she can't empty her bladder       ASSESSMENT & PLAN:   Diagnoses and all orders for this visit:    Dysuria  -     Urinalysis-UC if Indicated  -     Culture, Urine    Lower abdominal pain  -     Wet Prep, Vaginal  -     Chlamydia trachomatis & Neisseria gonorrhoeae, Amplified Detection      Patient with mild lower back pain.  Negative UA and wet prep.  Does spend most of the day every day on her feet which is the likely cause of back pain which is worse after working all day and sleeping.    As pain is relatively minor, recommend Tylenol and following up.  Good shoes, stand on comfort floor mat if able.    No obvious cause for lower abdomen discomfort discovered, but pain only lasts for 1 minute following urination and then is gone in between urinating episodes, so do not believe it represents a pelvic infection.  Believe it is safe for her to follow-up if this is persistent.  STD screening is pending.    Supportive care discussed.  See discharge instructions below for specific recommendations given.    At the end of the encounter, I discussed results, diagnosis, medications with the patient and/or caregivers. Discussed red flags for immediate return to clinic/ER, as well as indications for follow up if no improvement. Patient and/or caregiver understood and agreed to plan. Patient was stable for discharge.    25 minutes spent on the date of the encounter doing chart review, patient visit, documentation and Education on conditions including        SUBJECTIVE    HPI:  HPI  Shahbaz Daltonh presents to the walk-in clinic with   Chief Complaint   Patient presents with     Back Pain     X2 weeks, Lower back pain     Bladder Problem     X2 weeks, bladder pain, feels like she can't empty her bladder     Pain not associated with urinating. No pain in vaginal area.      Lower  abdominal pain.  Achy lower pain after emptying bladder.  Lasts 1-2 minutes - going on 2 weeks.      Denies chance of pregnancy.      Has had UTIs.      Back pain located across low back.  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  Pain is burning and aching and radiates to the upper back.      No lifting at work.  Pain is 2/10 right now and worse when sleeping.      Hasn't taken anything for pain.      Due to language barrier, an  was present during the history-taking and subsequent discussion (and for part of the physical exam) with this patient.      See ROS for additional symptoms and/or pertinent negatives.       History obtained from the patient.      Review of Systems   Gastrointestinal: Negative for constipation, diarrhea and vomiting.   Genitourinary: Negative for dysuria.       OBJECTIVE    Vitals:    05/10/21 1419   BP: 101/66   Patient Site: Right Arm   Patient Position: Sitting   Cuff Size: Adult Small   Pulse: 78   Temp: 97.9  F (36.6  C)   TempSrc: Oral   SpO2: 98%   Weight: (!) 97 lb 1 oz (44 kg)       Physical Exam  Abdominal:      Tenderness: There is no right CVA tenderness or left CVA tenderness.   Musculoskeletal: Normal range of motion.         General: Tenderness (lower back tenderness ) present.   Psychiatric:         Mood and Affect: Mood normal.         Behavior: Behavior normal.         Thought Content: Thought content normal.         Judgment: Judgment normal.         Labs/EKG:  Results for orders placed or performed in visit on 05/10/21   Culture, Urine    Specimen: Urine   Result Value Ref Range    Culture No Growth    Wet Prep, Vaginal    Specimen: Genital   Result Value Ref Range    Yeast Result No yeast seen No yeast seen    Trichomonas No Trichomonas seen No Trichomonas seen    Clue Cells, Wet Prep No Clue cells seen No Clue cells seen   Chlamydia trachomatis & Neisseria gonorrhoeae, Amplified Detection    Specimen: Body Fluid   Result Value Ref Range     Chlamydia trachomatis, Amplified Detection Negative Negative    Neisseria gonorrhoeae, Amplified Detection Negative Negative   Urinalysis-UC if Indicated   Result Value Ref Range    Color, UA Yellow Colorless, Yellow, Straw, Light Yellow    Clarity, UA Clear Clear    Glucose, UA Negative Negative    Protein, UA Negative Negative    Bilirubin, UA Negative Negative    Urobilinogen, UA 0.2 E.U./dL 0.2 E.U./dL, 1.0 E.U./dL    pH, UA 7.0 5.0 - 8.0    Blood, UA Negative Negative    Ketones, UA Negative Negative    Nitrite, UA Negative Negative    Leukocytes, UA Trace (!) Negative    Specific Gravity, UA 1.020 1.005 - 1.030    RBC, UA None Seen None Seen, 0-2 hpf    WBC, UA 0-5 None Seen, 0-5 hpf    Bacteria, UA None Seen None Seen    Squam Epithel, UA 10-25 (!) None Seen, 0-5 lpf           Radiology:      PATIENT INSTRUCTIONS:   Patient Instructions   Tylenol 1000 mg up to 3 times daily as needed for your back.      The swab to check for yeast infection and other vaginal infections is normal.    If your STD test is normal, we won't call.  If you have gonorrhea or chlamydia, we will call. Recheck in your clinic if abdominal symptom is still there in 1 week.      We will also call if you have a bladder infection and need to start antibiotics.

## 2021-06-17 NOTE — PROGRESS NOTES
Assessment & Plan     Acute bilateral low back pain without sciatica  Naprosyn twice daily with food for 5 days.  Referral to PT placed.  Follow-up if worsening or not improving, or if new symptoms  - Ambulatory referral to Adult PT- Internal  - naproxen (NAPROSYN) 500 MG tablet  Dispense: 10 tablet; Refill: 0    Encounter for gynecological examination without abnormal finding  She was concerned there was a problem with the uterus based on something her cousin read in the after visit summary from her walk-in care visit.  I have reviewed the after visit summary, labs, preliminary note (incomplete), and it is unclear what she is referring to.  She has a normal bimanual exam today other than some focal areas of firmness on the cervix, which I suspect are nabothian cysts, and for this reason a Pap smear was done.  Follow-up based on results.  - Gynecologic Cytology (PAP Smear)               No follow-ups on file.    Emmanuelle Zhang MD  [facility] ALLIE Nam is 36 y.o. and presents today for the following health issues   HPI   Lower back pain x 2-3 weeks. Does not radiate, no leg pain, no numbness, tingling, or weakness in legs. No prior history of back pain. Resting and tylenol helps, movement and activity makes it worse, especially twisting, lifting.    Felt like couldn't empty bladder, possibly dysuria, went to [facility] 5/10, neg chlamydia/gonorrhea, neg urine culture, neg wet prep. Since then urinary symptoms resolved.     She is concerned that was told from cousin who reads English that [facility] summary mentioned a growth in uterus that could grow. Review of AVS today does not show anything regarding a growth or problem in uterus. Patient called cousin during visit and had her look at paperwork, still unclear what she was recalling.  Walk-in clinic note is still preliminary, but pelvic imaging was not ordered and bimanual exam or pelvic exam not done, other than wet prep, so unclear  "what this information came from.          Review of Systems  Menses regular, monthly, LMP approx April 24.       Objective    BP 94/58 (Patient Site: Left Arm, Patient Position: Sitting, Cuff Size: Adult Regular)   Pulse 90   Temp 98.4  F (36.9  C) (Oral)   Ht 4' 10\" (1.473 m)   Wt (!) 97 lb (44 kg)   LMP 04/27/2021   SpO2 99%   BMI 20.27 kg/m    Body mass index is 20.27 kg/m .  Physical Exam  General: Pleasant well-appearing no acute distress  Pelvis normal external genitalia.  Bimanual exam reveals a few areas of firmness on the cervix, suspect nabothian cysts, so speculum exam was done, cervix appears normal, Pap collected to ensure normal.  Bimanual exam reveals no cervical motion tenderness, no uterine or adnexal masses or tenderness.  Back: No midline spine tenderness palpation.  On inspection, no abnormalities of the lower spine or skin overlying the spine  Neuro: Patellar and Achilles reflexes 2+ and symmetric.  Lower extremity strength and sensation is normal and symmetric              "

## 2021-06-17 NOTE — PATIENT INSTRUCTIONS - HE
Tylenol 1000 mg up to 3 times daily as needed for your back.      The swab to check for yeast infection and other vaginal infections is normal.    If your STD test is normal, we won't call.  If you have gonorrhea or chlamydia, we will call. Recheck in your clinic if abdominal symptom is still there in 1 week.      We will also call if you have a bladder infection and need to start antibiotics.

## 2021-06-18 NOTE — PATIENT INSTRUCTIONS - HE
Patient Instructions by Smita Prieto CNP at 4/19/2021  2:30 PM     Author: Smita Prieto CNP Service: -- Author Type: Nurse Practitioner    Filed: 4/19/2021  4:29 PM Encounter Date: 4/19/2021 Status: Addendum    : Smita Prieto CNP (Nurse Practitioner)    Related Notes: Original Note by Smita Prieto CNP (Nurse Practitioner) filed at 4/19/2021  4:25 PM       If medicine for throat seems to be working, can take daily for a month then follow up with your doctor.     Reduce spicy food       Patient Education     Lifestyle Changes for Controlling GERD  When you have GERD, stomach acid feels as if its backing up toward your mouth. Making lifestyle changes can often improve your symptoms. This is true if you take medicine to control your GERD or not. Talk with your healthcare provider about the following suggestions. They may help you get relief from your symptoms.      Raise your head  Reflux is more likely to happen when youre lying down flat. That's because stomach fluid can flow backward more easily. Raising the head of your bed 4 to 6 inches can help. To do this:    Slide blocks or books under the legs at the head of your bed. Or put a wedge under the mattress. Many [employer] stores can make a wedge for you. The wedge should go from your waist to the top of your head.    Dont just prop your head up on a few pillows. This increases pressure on your stomach. It can make GERD worse.  Watch your eating habits  Certain foods may increase the acid in your stomach. Or they may relax the lower esophageal sphincter. This makes GERD more likely. Its best to avoid the following if they cause you symptoms:    Coffee, tea, and carbonated drinks (with and without caffeine)    Fatty, fried, or spicy food    Mint, chocolate, onions, tomatoes, and citrus    Peppermint    Any other foods that seem to irritate your stomach or cause you pain  Relieve the pressure  Tips include the following:    Eat smaller meals, even  if you have to eat more often.    Dont lie down right after you eat. Wait a few hours for your stomach to empty.    Don't wear tight belts or tight-fitting clothes.    Lose any extra weight.  Tobacco and alcohol  Don't smoke tobacco or drink alcohol. They can make GERD symptoms worse.  Date Last Reviewed: 7/1/2016 2000-2019 [employer]. [address] [place] [address], [place] [id number]. All rights reserved. This information is not intended as a substitute for professional medical care. Always follow your healthcare professional's instructions.

## 2021-06-18 NOTE — PATIENT INSTRUCTIONS - HE
"Patient Instructions by Dale Proctor MD at 10/23/2020 12:50 PM     Author: Dale Proctor MD Service: -- Author Type: Physician    Filed: 10/23/2020  1:33 PM Encounter Date: 10/23/2020 Status: Addendum    : Dale Proctor MD (Physician)    Related Notes: Original Note by Dale Proctor MD (Physician) filed at 10/23/2020  1:31 PM       Call your clinic in 3 days if the symptoms are not improving. Sooner if they are worsening.  Patient Education     Bladder Infection, Female (Adult)    Urine is normally doesn't have any bacteria in it. But bacteria can get into the urinary tract from the skin around the rectum. Or they can travel in the blood from elsewhere in the body. Once they are in your urinary tract, they can cause infection in the urethra (urethritis), the bladder (cystitis), or the kidneys (pyelonephritis).  The most common place for an infection is in the bladder. This is called a bladder infection. This is one of the most common infections in women. Most bladder infections are easily treated. They are not serious unless the infection spreads to the kidney.  The phrases \"bladder infection,\" \"UTI,\" and \"cystitis\" are often used to describe the same thing. But they are not always the same. Cystitis is an inflammation of the bladder. The most common cause of cystitis is an infection.  Symptoms  The infection causes inflammation in the urethra and bladder. This causes many of the symptoms. The most common symptoms of a bladder infection are:    Pain or burning when urinating    Having to urinate more often than usual    Urgent need to urinate    Only a small amount of urine comes out    Blood in urine    Abdominal discomfort. This is usually in the lower abdomen above the pubic bone.    Cloudy urine    Strong- or bad-smelling urine    Unable to urinate (urinary retention)    Unable to hold urine in (urinary incontinence)    Fever    Loss of appetite    Confusion (in older adults)  Causes  Bladder " infections are not contagious. You can't get one from someone else, from a toilet seat, or from sharing a bath.  The most common cause of bladder infections is bacteria from the bowels. The bacteria get onto the skin around the opening of the urethra. From there, they can get into the urine and travel up to the bladder, causing inflammation and infection. This usually happens because of:    Wiping improperly after urinating. Always wipe from front to back.    Bowel incontinence    Pregnancy    Procedures such as having a catheter inserted    Older age    Not emptying your bladder. This can allow bacteria a chance to grow in your urine.    Dehydration    Constipation    Sex    Use of a diaphragm for birth control   Treatment  Bladder infections are diagnosed by a urine test. They are treated with antibiotics and usually clear up quickly without complications. Treatment helps prevent a more serious kidney infection.  Medicines  Medicines can help in the treatment of a bladder infection:    Take antibiotics until they are used up, even if you feel better. It is important to finish them to make sure the infection has cleared.    You can use acetaminophen or ibuprofen for pain, fever, or discomfort, unless another medicine was prescribed. If you have chronic liver or kidney disease, talk with your healthcare provider before using these medicines. Also talk with your provider if you've ever had a stomach ulcer or gastrointestinal bleeding, or are taking blood-thinner medicines.    If you are given phenazopydridine to reduce burning with urination, it will cause your urine to become a bright orange color. This can stain clothing.  Care and prevention  These self-care steps can help prevent future infections:    Drink plenty of fluids to prevent dehydration and flush out your bladder. Do this unless you must restrict fluids for other health reasons, or your doctor told you not to.    Proper cleaning after going to the  bathroom is important. Wipe from front to back after using the toilet to prevent the spread of bacteria.    Urinate more often. Don't try to hold urine in for a long time.    Wear loose-fitting clothes and cotton underwear. Avoid tight-fitting pants.    Improve your diet and prevent constipation. Eat more fresh fruit and vegetables, and fiber, and less junk and fatty foods.    Avoid sex until your symptoms are gone.    Avoid caffeine, alcohol, and spicy foods. These can irritate your bladder.    Urinate right after intercourse to flush out your bladder.    If you use birth control pills and have frequent bladder infections, discuss it with your doctor.  Follow-up care  Call your healthcare provider if all symptoms are not gone after 3 days of treatment. This is especially important if you have repeat infections.  If a culture was done, you will be told if your treatment needs to be changed. If directed, you can call to find out the results.  If X-rays were done, you will be told if the results will affect your treatment.  Call 911  Call 911 if any of the following occur:    Trouble breathing    Hard to wake up or confusion    Fainting or loss of consciousness    Rapid heart rate  When to seek medical advice  Call your healthcare provider right away if any of these occur:    Fever of 100.4 F (38.0 C) or higher, or as directed by your healthcare provider    Symptoms are not better by the third day of treatment    Back or belly (abdominal) pain that gets worse    Repeated vomiting, or unable to keep medicine down    Weakness or dizziness    Vaginal discharge    Pain, redness, or swelling in the outer vaginal area (labia)  Date Last Reviewed: 10/1/2016    3406-8191 [employer]. [address] [place] [address], [place] [address]. All rights reserved. This information is not intended as a substitute for professional medical care. Always follow your healthcare professional's instructions.

## 2021-06-18 NOTE — PATIENT INSTRUCTIONS - HE
Patient Instructions by Nicole Bella CNP at 8/14/2020 12:00 PM     Author: Nicole Bella CNP Service: -- Author Type: Nurse Practitioner    Filed: 8/14/2020 12:55 PM Encounter Date: 8/14/2020 Status: Signed    : Nicole Bella CNP (Nurse Practitioner)         Patient Education     Middle Ear Infection (Adult)  You have an infection of the middle ear, the space behind the eardrum. This is also called acute otitis media (AOM). Sometimes it is caused by the common cold. This is because congestion can block the internal passage (eustachian tube) that drains fluid from the middle ear. When the middle ear fills with fluid, bacteria can grow there and cause an infection. Oral antibiotics are used to treat this illness, not ear drops. Symptoms usually start to improve within 1 to 2 days of treatment.    Home care  The following are general care guidelines:    Finish all of the antibiotic medicine given, even though you may feel better after the first few days.    You may use over-the-counter medicine, such as acetaminophen or ibuprofen, to control pain and fever, unless something else was prescribed. If you have chronic liver or kidney disease or have ever had a stomach ulcer or gastrointestinal bleeding, talk with your healthcare provider before using these medicines. Do not give aspirin to anyone under 18 years of age who has a fever. It may cause severe illness or death.  Follow-up care  Follow up with your healthcare provider, or as advised, in 2 weeks if all symptoms have not gotten better, or if hearing doesn't go back to normal within 1 month.  When to seek medical advice  Call your healthcare provider right away if any of these occur:    Ear pain gets worse or does not improve after 3 days of treatment    Unusual drowsiness or confusion    Neck pain, stiff neck, or headache    Fluid or blood draining from the ear canal    Fever of 100.4 F (38 C) or as advised     Seizure  Date Last Reviewed:  6/1/2016 2000-2017 [employer]. [address] [place] [address], [place] [address]. All rights reserved. This information is not intended as a substitute for professional medical care. Always follow your healthcare professional's instructions.

## 2021-06-19 NOTE — LETTER
Letter by Waylon Olivia MD at      Author: Waylon Olivia MD Service: -- Author Type: --    Filed:  Encounter Date: 11/7/2019 Status: Signed         November 7, 2019     Patient: Shahbaz Nam   YOB: 1985   Date of Visit: 11/7/2019       To Whom it May Concern:    Shahbaz Nam was seen in my clinic on 11/7/2019.    Please excuse her from work due to illness.    She should be ready to return on Monday, 11/11/19.    If you have any questions or concerns, please don't hesitate to call.    Sincerely,         Electronically signed by Waylon Olivia MD   11/7/2019, 10:58 AM

## 2021-06-19 NOTE — LETTER
Letter by Kalyani Fuentes MD at      Author: Kalyani Fuentes MD Service: -- Author Type: --    Filed:  Encounter Date: 4/22/2019 Status: (Other)               [address]  [place] [address]   PHONE [phone]  [phone]     April 22, 2019  Shahbaz [facility]  [address]  [facility] [address]    Dear Ta:    Below are the results from your recent visit.  Your results are negative.      Resulted Orders   QTF-Mycobacterium tuberculosis by QuantiFERON-TB Gold Plus   Result Value Ref Range    QTF RESULT Negative Negative    QTF INTREPRETATION       No interferon-gamma response to M. tuberculosis antigens was detected.  Infecton with M. tuberculosis is unlikely.  A negative result alone does not exclude infection with M. tuberculosis    QTF NIL 0.17 IU/mL    QTF ANTIGEN TB1-NIL 0.14 IU/mL    QTF ANTIGEN TB2 - NIL 0.23 IU/mL    QTF MITOGEN-NIL 8.77 IU/mL         If you have any questions or concerns, please do not hesitate to call.    Sincerely,      Kalyani Fuentes MD  April 22, 2019

## 2021-06-20 NOTE — PROGRESS NOTES
"KARTHIKEYAN Nam is a 33 y.o. female here for   Runny nose, itchy eyes and throat for 1 week. At night, cough is bad- dry, not productive. No one else is sick. No stomach pain. No fevers or chills. Appetite is decreased.     She has had seasonal allergies in the past and this feels similar to that. She would like a refill on her allergy pill and cough syrup.       Past Medical History:   Diagnosis Date     Abnormal CXR (chest x-ray) 12/2017    right chest volume loss     Scoliosis      SVT (supraventricular tachycardia) (H)      Tuberculosis     had pulmonary TB in 2001, received 4 drug regimen.      Current Outpatient Prescriptions on File Prior to Visit   Medication Sig Dispense Refill     albuterol (PROAIR HFA;PROVENTIL HFA;VENTOLIN HFA) 90 mcg/actuation inhaler Inhale 2 puffs every 4 (four) hours as needed. 1 Inhaler 3     cetirizine (ZYRTEC) 10 MG tablet Take 1 tablet (10 mg total) by mouth daily. 30 tablet 2     ferrous gluconate (FERGON) 324 MG tablet Take 1 tablet (324 mg total) by mouth daily with breakfast. 90 tablet 4     guaiFENesin (ROBITUSSIN) 100 mg/5 mL syrup Take 10 mL (200 mg total) by mouth 3 (three) times a day as needed for cough. 236 mL 0     Current Facility-Administered Medications on File Prior to Visit   Medication Dose Route Frequency Provider Last Rate Last Dose     albuterol nebulizer solution 3 mL (PROVENTIL)  3 mL Nebulization Once Diamond Valladares MD           Past medical history reviewed with no changes.   ?  O  BP 98/62  Pulse 64  Temp 98.2  F (36.8  C) (Oral)   Resp 16  Ht 4' 9.75\" (1.467 m)  Wt (!) 80 lb 12 oz (36.6 kg)  LMP  (LMP Unknown)  SpO2 99% Comment: RA  Breastfeeding? No  BMI 17.02 kg/m2   Vitals reviewed. Nursing note reviewed.  General Appearance: Pleasant and alert, in no acute distress  HEENT: TMs clear, oropharynx without edema or exudate, sneezing occasionally, mucous membranes moist  CV: RRR, no murmur, rubs, gallops  Resp: No respiratory distress. Clear to " auscultation bilaterally. No wheezes, rales, rhonchi  Skin: warm, dry, intact, no rash noted  Neuro: no focal deficits, CNs II-XII normal.   A/P  Ta was seen today for cough, headache, sore throat and allergies.    Diagnoses and all orders for this visit:    Sore throat  -     Rapid Strep A Screen- Throat Swab- positive. I did not see result until the patient had already left clinic. Will sent Rx for amoxicillin to pharmacy and ask [facility] to call her.     Need for immunization against influenza  -     Influenza, Seasonal,Quad Inj, 36+ MOS    Acute seasonal allergic rhinitis due to pollen  -     cetirizine (ZYRTEC) 10 MG tablet; Take 1 tablet (10 mg total) by mouth daily.    Dry cough  -     guaiFENesin (ROBITUSSIN) 100 mg/5 mL syrup; Take 10 mL (200 mg total) by mouth 3 (three) times a day as needed for cough.    The entire visit was conducted through a professional .   Options for treatment and follow-up care were reviewed with the patient and/or guardian. Shahbaz Cyril and/or guardian engaged in the decision making process and verbalized understanding of the options discussed and agreed with the final plan.    Jacqueline Monteiro MD

## 2021-06-21 NOTE — LETTER
Letter by Smita Prieto CNP at      Author: Smita Prieto CNP Service: -- Author Type: --    Filed:  Encounter Date: 5/10/2021 Status: (Other)         May 10, 2021     Patient: Shahbaz Nam   YOB: 1985   Date of Visit: 5/10/2021       To Whom It May Concern:    It is my medical opinion that Shahbaz Nam may return to work on 5/11/2021.    If you have any questions or concerns, please don't hesitate to call.    Sincerely,        Electronically signed by Smita Prieto CNP

## 2021-06-21 NOTE — LETTER
Letter by Smita Prieto CNP at      Author: Smita Prieto CNP Service: -- Author Type: --    Filed:  Encounter Date: 4/19/2021 Status: (Other)         April 19, 2021     Patient: Shahbaz Nam   YOB: 1985   Date of Visit: 4/19/2021       To Whom it May Concern:    Shahbaz Nam was seen in my clinic on 4/19/2021.  Please excuse her for urgent visit.  OK for work tomorrow.      If you have any questions or concerns, please don't hesitate to call.    Sincerely,         Electronically signed by Smita Prieto CNP

## 2021-06-21 NOTE — LETTER
Letter by Carly Mckeon RN at      Author: Carly Mckeon RN Service: -- Author Type: --    Filed:  Encounter Date: 5/24/2021 Status: (Other)         Shahbaz Nam  [address] [place] [address]             May 24, 2021         Dear Ms. Cyril,    We are happy to inform you that your recent Pap smear and Human Papillomavirus (HPV) test results are normal and negative.    It is recommended that you have your next Pap smear and Human Papillomavirus (HPV) test in 5 years. You will also need to return to the clinic every year for an annual wellness visit.    If you have additional questions regarding this result, please contact our office and we will be happy to assist you.      Sincerely,    Your [facility] Care Team

## 2021-06-25 NOTE — TELEPHONE ENCOUNTER
Spoke to pt via interp services and gave her the results from the provider. She had no questions. -TU

## 2021-06-25 NOTE — TELEPHONE ENCOUNTER
----- Message from Jeremias Montiel MD sent at 6/9/2021  5:16 PM CDT -----  Please let the patient know that her blood work has come back so far is unremarkable.  Recommend she follows up with your PCP for further management and evaluation.

## 2021-06-25 NOTE — TELEPHONE ENCOUNTER
Attempted to contact patient twice using the  services, unable to reach or leave a voicemail for patient.

## 2021-06-26 NOTE — PROGRESS NOTES
Chief Complaint   Patient presents with     Dizziness     almost 1 month, nausea, fatigue, loss of appetite     Blood pressure 102/68, pulse 76, temperature 98.7  F (37.1  C), temperature source Oral, resp. rate 16, last menstrual period 05/21/2021, SpO2 98 %, not currently breastfeeding.         SUBJECTIVE       Ta is a 36 y.o.  female with a PMH significant for:     Patient Active Problem List   Diagnosis     Vitamin D Deficiency     Scoliosis     Amenorrhea     Restrictive lung disease     Elevated WBC count     Seasonal allergies     SVT (supraventricular tachycardia) (H)     Anemia, chronic disease     Patient complaining of dizziness for the past month.  Associated symptoms include nausea, fatigue, loss of appetite.  Denies any sick contacts, fevers, chills, shortness of breath, chest pain, palpitations.  States that moving her head too quickly does make her symptoms worse.  Denies any photo or phonophobia.  No headache or neck stiffness.  Has tried sour foods which has helped with her nausea.  Wondering if she can get any other medications to help with her symptoms.  Also requesting work note, since she was not able to work due to her symptoms starting yesterday.         OBJECTIVE     Vitals:    06/09/21 1542   BP: 102/68   Pulse: 76   Resp: 16   Temp: 98.7  F (37.1  C)   TempSrc: Oral   SpO2: 98%     There is no height or weight on file to calculate BMI.    Constitutional: Awake, alert, cooperative, no acute distress, and appears stated age.  Eyes: sclera clear, conjunctiva normal.  ENT: NC/AT, MMM  Neck: Supple, symmetrical, trachea midline  Lungs: No increased WOB, CTABL, no crackles or wheezing appreciated.  Cardiovascular: Appears well perfused, RRR, normal S1 and S2, no S3 or S4, and no murmur appreciated.  Neurologic: Cranial nerves II-XII are grossly intact.  Sensory is intact. Gait is normal.  Neuropsychiatric: Normal affect, mood, orientation, memory and insight.      Pertinent Labs  Recent Results  (from the past 24 hour(s))   Comprehensive Metabolic Panel   Result Value Ref Range    Sodium 140 136 - 145 mmol/L    Potassium 3.9 3.5 - 5.0 mmol/L    Chloride 107 98 - 107 mmol/L    CO2 24 22 - 31 mmol/L    Anion Gap, Calculation 9 5 - 18 mmol/L    Glucose 86 70 - 125 mg/dL    BUN 8 8 - 22 mg/dL    Creatinine 0.74 0.60 - 1.10 mg/dL    GFR MDRD Af Amer >60 >60 mL/min/1.73m2    GFR MDRD Non Af Amer >60 >60 mL/min/1.73m2    Bilirubin, Total 1.1 (H) 0.0 - 1.0 mg/dL    Calcium 8.9 8.5 - 10.5 mg/dL    Protein, Total 8.0 6.0 - 8.0 g/dL    Albumin 4.3 3.5 - 5.0 g/dL    Alkaline Phosphatase 55 45 - 120 U/L    AST 19 0 - 40 U/L    ALT 10 0 - 45 U/L   Thyroid Cascade   Result Value Ref Range    TSH 2.00 0.30 - 5.00 uIU/mL   HM1 (CBC with Diff)   Result Value Ref Range    WBC 10.3 4.0 - 11.0 thou/uL    RBC 4.37 3.80 - 5.40 mill/uL    Hemoglobin 12.2 12.0 - 16.0 g/dL    Hematocrit 37.7 35.0 - 47.0 %    MCV 86 80 - 100 fL    MCH 27.9 27.0 - 34.0 pg    MCHC 32.4 32.0 - 36.0 g/dL    RDW 12.5 11.0 - 14.5 %    Platelets 266 140 - 440 thou/uL    MPV 9.9 7.0 - 10.0 fL    Neutrophils % 63 50 - 70 %    Lymphocytes % 24 20 - 40 %    Monocytes % 10 2 - 10 %    Eosinophils % 2 0 - 6 %    Basophils % 1 0 - 2 %    Immature Granulocyte % 0 <=0 %    Neutrophils Absolute 6.5 2.0 - 7.7 thou/uL    Lymphocytes Absolute 2.4 0.8 - 4.4 thou/uL    Monocytes Absolute 1.1 (H) 0.0 - 0.9 thou/uL    Eosinophils Absolute 0.3 0.0 - 0.4 thou/uL    Basophils Absolute 0.1 0.0 - 0.2 thou/uL    Immature Granulocyte Absolute 0.0 <=0.0 thou/uL             ASSESSMENT AND PLAN     Ta was seen today for dizziness.    Diagnoses and all orders for this visit:    Dizziness  Suspect possible BPPV given history, though would like to rule out anemia, thyroid disease, metabolic abnormalities with blood work.  Advised to try meclizine and follow-up with PCP for further management and evaluation.  Physical exam reassuring, return precautions provided.  CBC, BMP, thyroid all  unremarkable.  If symptoms continue to persist, could consider head MRI.  -     Comprehensive Metabolic Panel  -     HM1(CBC and Differential)  -     Thyroid Cascade  -     meclizine (ANTIVERT) 25 mg tablet; Take 1 tablet (25 mg total) by mouth 3 (three) times a day as needed for dizziness or nausea.    Nausea  -     ondansetron (ZOFRAN-ODT) 4 MG disintegrating tablet; Take 1 tablet (4 mg total) by mouth every 8 (eight) hours as needed for nausea.          Jeremias Tiana  6/9/2021

## 2021-06-26 NOTE — PROGRESS NOTES
Please let the patient know that her blood work has come back so far is unremarkable.  Recommend she follows up with your PCP for further management and evaluation.

## 2021-06-29 NOTE — PROGRESS NOTES
Progress Notes by Nicole Bella CNP at 8/14/2020 12:00 PM     Author: Nicole Bella CNP Service: -- Author Type: Nurse Practitioner    Filed: 8/14/2020  1:07 PM Encounter Date: 8/14/2020 Status: Signed    : Nicole Bella CNP (Nurse Practitioner)       No chief complaint on file.      HPI:  Shahbaz Nam is a 35 y.o. female who presents today complaining of itchy throat and ear pain for the past few days. Reports minimal discomfort, no OTC medication. Denies any known ill contacts or COVID exposures.     History obtained from the patient.    Problem List:  2020-01: Anemia, chronic disease  2019-07: Appendicitis  2019-07: SVT (supraventricular tachycardia) (H)  2018-09: Seasonal allergies  2017-12: Elevated WBC count  2015-01: Acute sinusitis  2015-01: Cough  2014-07: Normal pregnancy, repeat  2014-07: Normal delivery  Vitamin D Deficiency  Lower Back Pain  Scoliosis  Amenorrhea  Shortness of breath  Restrictive lung disease  Cough      Past Medical History:   Diagnosis Date   ? Abnormal CXR (chest x-ray) 12/2017    right chest volume loss   ? Anemia, chronic disease 1/9/2020   ? Scoliosis    ? SVT (supraventricular tachycardia) (H)    ? Tuberculosis     had pulmonary TB in 2001, received 4 drug regimen.        Social History     Tobacco Use   ? Smoking status: Never Smoker   ? Smokeless tobacco: Never Used   Substance Use Topics   ? Alcohol use: No       Review of Systems   HENT: Positive for ear pain and sore throat.    All other systems reviewed and are negative.      Vitals:    08/14/20 1216   BP: 114/80   Patient Position: Sitting   Pulse: (!) 108   Resp: 18   Temp: 98.8  F (37.1  C)   TempSrc: Oral   SpO2: 98%       Patient evaluated with full PPE throughout entire exam, due to COVID-19 pandemic    Physical Exam  Constitutional:       Appearance: Normal appearance.   HENT:      Head: Normocephalic and atraumatic.      Right Ear: Tympanic membrane, ear canal and external ear normal.      Left Ear:  External ear normal.      Ears:      Comments: LEFT TM with erythema and fluid, mild bulge, landmarks not visualized     Nose: Congestion present. No rhinorrhea.      Mouth/Throat:      Mouth: Mucous membranes are moist.      Pharynx: Posterior oropharyngeal erythema present. No oropharyngeal exudate.   Eyes:      General:         Right eye: No discharge.         Left eye: No discharge.      Extraocular Movements: Extraocular movements intact.      Conjunctiva/sclera: Conjunctivae normal.      Pupils: Pupils are equal, round, and reactive to light.   Neck:      Musculoskeletal: Neck supple.   Cardiovascular:      Rate and Rhythm: Regular rhythm. Tachycardia present.      Pulses: Normal pulses.      Heart sounds: Normal heart sounds.   Pulmonary:      Effort: Pulmonary effort is normal.      Breath sounds: Normal breath sounds.   Lymphadenopathy:      Cervical: Cervical adenopathy present.   Skin:     General: Skin is warm.      Capillary Refill: Capillary refill takes less than 2 seconds.      Coloration: Skin is not pale.      Findings: No erythema or rash.   Neurological:      General: No focal deficit present.      Mental Status: She is alert and oriented to person, place, and time. Mental status is at baseline.   Psychiatric:         Mood and Affect: Mood normal.         Behavior: Behavior normal.         No notes on file    Labs:  Recent Results (from the past 72 hour(s))   Thyroid Cascade   Result Value Ref Range    TSH 1.41 0.30 - 5.00 uIU/mL   Rapid Strep A Screen-Throat swab    Specimen: Throat   Result Value Ref Range    Rapid Strep A Antigen No Group A Strep detected, presumptive negative No Group A Strep detected, presumptive negative       Radiology:None obtained    Clinical Decision Making: At the end of the encounter, I discussed results, diagnosis, medications. Discussed negative rapid strep results, and will treat for ear infection present, encouraged completion of antibiotics. Reviewed when to  return to clinic for follow up if no improvement. Patient understood and agreed to plan.    ERIN Greene, CNP       1. Non-recurrent acute suppurative otitis media of left ear without spontaneous rupture of tympanic membrane  amoxicillin (AMOXIL) 875 MG tablet   2. Sore throat  Rapid Strep A Screen-Throat swab    Group A Strep, RNA Direct Detection, Throat         Patient Instructions     Patient Education     Middle Ear Infection (Adult)  You have an infection of the middle ear, the space behind the eardrum. This is also called acute otitis media (AOM). Sometimes it is caused by the common cold. This is because congestion can block the internal passage (eustachian tube) that drains fluid from the middle ear. When the middle ear fills with fluid, bacteria can grow there and cause an infection. Oral antibiotics are used to treat this illness, not ear drops. Symptoms usually start to improve within 1 to 2 days of treatment.    Home care  The following are general care guidelines:    Finish all of the antibiotic medicine given, even though you may feel better after the first few days.    You may use over-the-counter medicine, such as acetaminophen or ibuprofen, to control pain and fever, unless something else was prescribed. If you have chronic liver or kidney disease or have ever had a stomach ulcer or gastrointestinal bleeding, talk with your healthcare provider before using these medicines. Do not give aspirin to anyone under 18 years of age who has a fever. It may cause severe illness or death.  Follow-up care  Follow up with your healthcare provider, or as advised, in 2 weeks if all symptoms have not gotten better, or if hearing doesn't go back to normal within 1 month.  When to seek medical advice  Call your healthcare provider right away if any of these occur:    Ear pain gets worse or does not improve after 3 days of treatment    Unusual drowsiness or confusion    Neck pain, stiff neck, or  headache    Fluid or blood draining from the ear canal    Fever of 100.4 F (38 C) or as advised     Seizure  Date Last Reviewed: 6/1/2016 2000-2017 [employer]. [address] [place] [address], [place] [address]. All rights reserved. This information is not intended as a substitute for professional medical care. Always follow your healthcare professional's instructions.

## 2021-06-30 NOTE — PROGRESS NOTES
Progress Notes by Smita Prieto CNP at 4/19/2021  2:30 PM     Author: Smita Prieto CNP Service: -- Author Type: Nurse Practitioner    Filed: 4/24/2021  3:57 PM Encounter Date: 4/19/2021 Status: Signed    : Smita Prieto CNP (Nurse Practitioner)       Chief Complaint   Patient presents with   ? Sore Throat     X 3-4 days, constant   ? Fatigue       ASSESSMENT & PLAN:   Diagnoses and all orders for this visit:    Gastroesophageal reflux disease with esophagitis, unspecified whether hemorrhage  -     Discontinue: aluminum-magnesium hydroxide-simethicone 200-200-20 mg/5 mL suspension 15 mL (MAALOX ADVANCED)  -     viscous lidocaine HC 2 % solution 15 mL  -     omeprazole (PRILOSEC) 20 MG capsule; Take 1 capsule (20 mg total) by mouth daily before breakfast.  Dispense: 30 capsule; Refill: 0    Sore throat  -     Rapid Strep A Screen-Throat swab  -     Group A Strep PCR Throat Swab  -     Symptomatic COVID-19 Virus (CORONAVIRUS) PCR  -     acetaminophen (TYLENOL EXTRA STRENGTH) 500 MG tablet; Take 2 tablets (1,000 mg total) by mouth every 6 (six) hours as needed for pain.  Dispense: 100 tablet; Refill: 0  -     SARS-CoV-2 (COVID-19)-PCR      GI cocktail improved mid anterior neck pain to 0/10.     Likely caused by heartburn as she has been having frequent episodes and no other obvious cause with negative strep and no URI symptoms.      Trial of omeprazole, follow-up if symptoms are not improving.    Unrelated dental pain, 2 out of 10 without facial swelling nor acute issue in need of treatment today.  Should make a dental appointment.  Patient states she does have a dentist.    Supportive care discussed.  See discharge instructions below for specific recommendations given.    At the end of the encounter, I discussed results, diagnosis, medications with the patient and/or caregivers. Discussed red flags for immediate return to clinic/ER, as well as indications for follow up if no improvement. Patient  and/or caregiver understood and agreed to plan. Patient was stable for discharge.    20 minutes spent on the date of the encounter doing chart review, history and exam, documentation and further activities per the note      SUBJECTIVE    HPI:  HPI  Shahbaz Nam presents to the walk-in clinic with   Chief Complaint   Patient presents with   ? Sore Throat     X 3-4 days, constant   ? Fatigue     36 year old Sharon-speaking female who works at a [employer] presents with sore throat around level of mid anterior neck.  Has not tried anything for throat pain.    Also states 2/10 left sided lower teeth molar pain.  States she has not been to the dentist in about a year.  She also feels fatigued and dizzy at times.  Denies fever, Rhinorrhea or sinus pain.  States she does have seasonal allergies and has itchy eyes from them. States that she has been having heartburn.      Denies exposure to COVID and has had the Dylan and Dylan vaccine about 2 weeks ago.  Denies tonsillectomy.    Due to language barrier, an  was present during the history-taking and subsequent discussion (and for part of the physical exam) with this patient.      Last episode of heartburn yesterday.  Has about 1-2x episodes per week.  Has not tried treating heartburn with anything.      Symptoms started: 4 days ago    Known exposures: None    See ROS for additional symptoms and/or pertinent negatives.       History obtained from chart review and the patient.      Review of Systems   Constitutional: Positive for fatigue. Negative for chills and fever.   HENT: Positive for dental problem and sore throat. Negative for congestion, ear discharge, ear pain, mouth sores, rhinorrhea, sinus pain and sneezing.    Eyes: Positive for itching. Negative for redness.   Respiratory: Negative for cough and shortness of breath.    Allergic/Immunologic: Positive for environmental allergies.   Neurological: Positive for dizziness. Negative for headaches.        OBJECTIVE    Vitals:    04/19/21 1439   BP: 95/63   Patient Site: Right Arm   Patient Position: Sitting   Cuff Size: Adult Regular   Pulse: 94   Temp: 98.1  F (36.7  C)   TempSrc: Oral   SpO2: 98%   Weight: (!) 97 lb 6 oz (44.2 kg)         Labs/EKG:  Results for orders placed or performed in visit on 04/19/21   Rapid Strep A Screen-Throat swab    Specimen: Throat   Result Value Ref Range    Rapid Strep A Antigen No Group A Strep detected, presumptive negative No Group A Strep detected, presumptive negative   Group A Strep PCR Throat Swab    Specimen: Throat   Result Value Ref Range    Group Strep A by PCR No Group A Strep detected No Group A Strep detected, Invalid, ERROR   COVID-19 Virus PCR MRF    Specimen: Respiratory   Result Value Ref Range    COVID-19 VIRUS SPECIMEN SOURCE Nasopharyngeal     2019-nCOV       Test received-See reflex to IDDL test SARS CoV2 (COVID-19) Virus RT-PCR   SARS-CoV-2 (COVID-19)-PCR    Specimen: Respiratory   Result Value Ref Range    SARS-CoV-2 Virus Specimen Source Nasopharyngeal     SARS-CoV-2 PCR Result NEGATIVE     SARS-COV-2 PCR COMMENT       Testing was performed using the [employer] SARS-CoV-2 Assay on the [place]           Radiology:      PATIENT INSTRUCTIONS:   Patient Instructions     If medicine for throat seems to be working, can take daily for a month then follow up with your doctor.     Reduce spicy food       Patient Education     Lifestyle Changes for Controlling GERD  When you have GERD, stomach acid feels as if its backing up toward your mouth. Making lifestyle changes can often improve your symptoms. This is true if you take medicine to control your GERD or not. Talk with your healthcare provider about the following suggestions. They may help you get relief from your symptoms.      Raise your head  Reflux is more likely to happen when youre lying down flat. That's because stomach fluid can flow backward more easily. Raising the head of your bed 4 to 6 inches can  help. To do this:    Slide blocks or books under the legs at the head of your bed. Or put a wedge under the mattress. Many foam stores can make a wedge for you. The wedge should go from your waist to the top of your head.    Dont just prop your head up on a few pillows. This increases pressure on your stomach. It can make GERD worse.  Watch your eating habits  Certain foods may increase the acid in your stomach. Or they may relax the lower esophageal sphincter. This makes GERD more likely. Its best to avoid the following if they cause you symptoms:    Coffee, tea, and carbonated drinks (with and without caffeine)    Fatty, fried, or spicy food    Mint, chocolate, onions, tomatoes, and citrus    Peppermint    Any other foods that seem to irritate your stomach or cause you pain  Relieve the pressure  Tips include the following:    Eat smaller meals, even if you have to eat more often.    Dont lie down right after you eat. Wait a few hours for your stomach to empty.    Don't wear tight belts or tight-fitting clothes.    Lose any extra weight.  Tobacco and alcohol  Don't smoke tobacco or drink alcohol. They can make GERD symptoms worse.  Date Last Reviewed: 7/1/2016 2000-2019 [employer]. [address] [place] [address], [place] [address]. All rights reserved. This information is not intended as a substitute for professional medical care. Always follow your healthcare professional's instructions.

## 2021-07-04 NOTE — LETTER
Letter by Jeremias Montiel MD at      Author: Jeremias Montiel MD Service: -- Author Type: --    Filed:  Encounter Date: 6/9/2021 Status: (Other)         June 9, 2021     Patient: Shahbaz Nam   YOB: 1985   Date of Visit: 6/9/2021       To Whom It May Concern:    Please excuse Shahbaz Nam from work starting 6/8/21 until 6/10/21.      If you have any questions or concerns, please don't hesitate to call.    Sincerely,        Electronically signed by Jeremias Montiel MD

## 2021-07-06 VITALS
RESPIRATION RATE: 16 BRPM | HEART RATE: 76 BPM | TEMPERATURE: 98.7 F | SYSTOLIC BLOOD PRESSURE: 102 MMHG | DIASTOLIC BLOOD PRESSURE: 68 MMHG | OXYGEN SATURATION: 98 %

## 2021-09-05 ENCOUNTER — OFFICE VISIT (OUTPATIENT)
Dept: FAMILY MEDICINE | Facility: CLINIC | Age: 36
End: 2021-09-05
Payer: COMMERCIAL

## 2021-09-05 VITALS
TEMPERATURE: 98.4 F | SYSTOLIC BLOOD PRESSURE: 95 MMHG | HEART RATE: 91 BPM | OXYGEN SATURATION: 99 % | DIASTOLIC BLOOD PRESSURE: 67 MMHG

## 2021-09-05 DIAGNOSIS — R07.0 THROAT PAIN: Primary | ICD-10-CM

## 2021-09-05 DIAGNOSIS — R05.9 COUGH: ICD-10-CM

## 2021-09-05 PROCEDURE — U0005 INFEC AGEN DETEC AMPLI PROBE: HCPCS | Performed by: FAMILY MEDICINE

## 2021-09-05 PROCEDURE — 99213 OFFICE O/P EST LOW 20 MIN: CPT | Performed by: FAMILY MEDICINE

## 2021-09-05 PROCEDURE — U0003 INFECTIOUS AGENT DETECTION BY NUCLEIC ACID (DNA OR RNA); SEVERE ACUTE RESPIRATORY SYNDROME CORONAVIRUS 2 (SARS-COV-2) (CORONAVIRUS DISEASE [COVID-19]), AMPLIFIED PROBE TECHNIQUE, MAKING USE OF HIGH THROUGHPUT TECHNOLOGIES AS DESCRIBED BY CMS-2020-01-R: HCPCS | Performed by: FAMILY MEDICINE

## 2021-09-05 RX ORDER — METOCLOPRAMIDE 10 MG/1
10 TABLET ORAL
COMMUNITY
Start: 2021-06-28 | End: 2023-01-08

## 2021-09-05 RX ORDER — PYRIDOXINE HCL (VITAMIN B6) 25 MG
25 TABLET ORAL
COMMUNITY
Start: 2021-06-28 | End: 2023-01-08

## 2021-09-05 NOTE — PROGRESS NOTES
Shahbaz Nam is a 36 year old female who comes in today for cough for 3 days    Staying same    A little phlegm    White    Both head and chest    No fever or chills    Throat pain, not ear pain    Itchy ears     Patient pregnant, going okay    5 months along    Staying well hydrated    Some muscle tightness in back of neck    Patient had covid shot        ROS    Physical Exam  Constitutional:       Appearance: Normal appearance.   HENT:      Head: Normocephalic and atraumatic.      Right Ear: Tympanic membrane, ear canal and external ear normal.      Left Ear: Ear canal and external ear normal.      Ears:      Comments: Some chronic changes in left tympanic membrane but no obvious infection; no ear pain at all per patient      Mouth/Throat:      Mouth: Mucous membranes are moist.      Pharynx: Oropharynx is clear.   Eyes:      Conjunctiva/sclera: Conjunctivae normal.   Cardiovascular:      Rate and Rhythm: Normal rate and regular rhythm.      Pulses: Normal pulses.      Heart sounds: Normal heart sounds.   Pulmonary:      Breath sounds: Normal breath sounds.   Abdominal:      Palpations: Abdomen is soft.      Comments: Gravid     Neurological:      Mental Status: She is alert.     a little tender in submandib area but no enlarged nodes felt       ASSESSMENT / PLAN:  (R07.0) Throat pain  (primary encounter diagnosis)  Comment: check for covid.  Overall exam and history very reassuring.    Plan: Symptomatic COVID-19 Virus (Coronavirus) by PCR        Nose        Stay well hydrated.     (R05) Cough  Comment: as above ; no coughing here at all  Plan: as above    Follow up in clinic in 2-3 days if symptoms not resolving    Be seen promptly if symptoms acutely worsen       I reviewed the patient's medications, allergies, medical history, family history, and social history.    Og Oseguera MD

## 2021-09-05 NOTE — LETTER
September 7, 2021    Shahbaz Nam  [address] [place] [address]          Dear ,    We are writing to inform you of your test results.    Covid test negative       Resulted Orders   Symptomatic COVID-19 Virus (Coronavirus) by PCR Nose   Result Value Ref Range    SARS CoV2 PCR Negative Negative      Comment:      NEGATIVE: SARS-CoV-2 (COVID-19) RNA not detected, presumed negative.    Narrative    Testing was performed using the Xpert Xpress SARS-CoV-2 Assay on the  [employer]. Additional information about  this Emergency Use Authorization (EUA) assay can be found via the Lab  Guide. This test should be ordered for the detection of SARS-CoV-2 in  individuals who meet SARS-CoV-2 clinical and/or epidemiological  criteria. Test performance is unknown in asymptomatic patients. This  test is for in vitro diagnostic use under the FDA EUA for  laboratories certified under CLIA to perform high complexity testing.  This test has not been FDA cleared or approved. A negative result  does not rule out the presence of PCR inhibitors in the specimen or  target RNA in concentration below the limit of detection for the  assay. The possibility of a false negative should be considered if  the patient's recent exposure or clinical presentation suggests  COVID-19. This test was validated by the [facility]. This laboratory is certified under  the Clinical Laboratory Improvement Amendments of 1988 (CLIA-88) as  qualified to perform high complexity laboratory testing.         If you have any questions or concerns, please call the clinic at the number listed above.       Sincerely,      Og Oseguera MD

## 2021-09-05 NOTE — PATIENT INSTRUCTIONS
Stay well hydrated    Follow up in clinic Tuesday or Wednesday if symptoms not resolving    Be seen promptly if symptoms acutely worsen

## 2021-09-06 LAB — SARS-COV-2 RNA RESP QL NAA+PROBE: NEGATIVE

## 2021-12-13 ENCOUNTER — PATIENT OUTREACH (OUTPATIENT)
Dept: FAMILY MEDICINE | Facility: CLINIC | Age: 36
End: 2021-12-13
Payer: COMMERCIAL

## 2022-02-25 ENCOUNTER — OFFICE VISIT (OUTPATIENT)
Dept: FAMILY MEDICINE | Facility: CLINIC | Age: 37
End: 2022-02-25
Payer: COMMERCIAL

## 2022-02-25 VITALS
HEART RATE: 92 BPM | HEIGHT: 58 IN | OXYGEN SATURATION: 98 % | DIASTOLIC BLOOD PRESSURE: 62 MMHG | WEIGHT: 87.25 LBS | TEMPERATURE: 98.4 F | SYSTOLIC BLOOD PRESSURE: 84 MMHG | BODY MASS INDEX: 18.32 KG/M2 | RESPIRATION RATE: 16 BRPM

## 2022-02-25 DIAGNOSIS — Z91.89 AT RISK FOR BREASTFEEDING DIFFICULTY: ICD-10-CM

## 2022-02-25 DIAGNOSIS — D62 ANEMIA DUE TO BLOOD LOSS, ACUTE: ICD-10-CM

## 2022-02-25 DIAGNOSIS — Z11.59 NEED FOR HEPATITIS C SCREENING TEST: Primary | ICD-10-CM

## 2022-02-25 DIAGNOSIS — K59.01 SLOW TRANSIT CONSTIPATION: ICD-10-CM

## 2022-02-25 LAB — HGB BLD-MCNC: 12.4 G/DL (ref 11.7–15.7)

## 2022-02-25 PROCEDURE — 36415 COLL VENOUS BLD VENIPUNCTURE: CPT | Performed by: FAMILY MEDICINE

## 2022-02-25 PROCEDURE — 99213 OFFICE O/P EST LOW 20 MIN: CPT | Performed by: FAMILY MEDICINE

## 2022-02-25 PROCEDURE — 85018 HEMOGLOBIN: CPT | Performed by: FAMILY MEDICINE

## 2022-02-25 RX ORDER — DOCUSATE SODIUM 100 MG/1
100 CAPSULE, LIQUID FILLED ORAL 2 TIMES DAILY
Qty: 180 CAPSULE | Refills: 3 | Status: SHIPPED | OUTPATIENT
Start: 2022-02-25

## 2022-02-25 RX ORDER — FERROUS SULFATE 325(65) MG
1 TABLET ORAL DAILY
COMMUNITY
Start: 2022-02-15 | End: 2023-01-08

## 2022-02-25 NOTE — PROGRESS NOTES
"  Hospital Follow-up Visit:    Hospital/Nursing Home/IP Rehab Facility: [facility]  Date of Admission: 2/12/22  Date of Discharge: 2/16/22  Reason(s) for Admission: induction due to gestational diabetes.        Was your hospitalization related to COVID-19? No   Problems taking medications regularly:  None  Medication changes since discharge: None  Problems adhering to non-medication therapy:  None    Summary of hospitalization:  CareEverywhere information obtained and reviewed.  Induction for gestational diabetes and preeclampsia.  .  Had postpartum hemorrhage, had D and C.    Ended up with uterine artery embolization and transfusion.   She was recommended not to have further pregnancies.    The parents have decided not to have more children, and say they were recommended not to have more babies.    She is currently breastfeeding and has a good milk supply.  She is also doing some bottlefeeding.    She is eating and drinking well.  She was discharged on iron and pnv.  She is stooling and urinating well.  She denies any headache, vision changes, dizziness, lightheadedness.  No persistent headache.  She has hard stools.  She does not have a stool softener at home.  She has tylenol, which helps her headaches.  No vision changes.    Diagnostic Tests/Treatments reviewed.  Follow up needed: hemoglobin.    Other Healthcare Providers Involved in Patient s Care:         unknown . Likely ob, she says she has an appt in march.    Update since discharge: improved.       Post Discharge Medication Reconciliation: discharge medications reconciled and changed, per note/orders.  Plan of care communicated with patient     BP (!) 84/62   Pulse 92   Temp 98.4  F (36.9  C) (Oral)   Resp 16   Ht 1.473 m (4' 10\")   Wt 39.6 kg (87 lb 4 oz)   SpO2 98%   Breastfeeding Yes   BMI 18.24 kg/m    Gen:  Nad, alert  Tension along bilateral trapezius muscles.  This is where her headache is.    Shallow latch with breastfeeding.    Rrr, " no m/r/g  cta bilaterally, though little to no breath sounds on the right.    No edema  zully's sign is negative.    Skin:  No rashes.  Healing wounds from radiology procedure on right neck and right groin.    No thyromegaly or nodules.    Psych:  No depression or sadness.      Last hemoglobin was 8 10 days ago.   Platelets were 89  Normal lft's in the hospital.        1. Need for hepatitis C screening test  done with prenatal labs at [facility].    Was negative     2. Slow transit constipation  Increase water intake.    Start colace  - docusate sodium (COLACE) 100 MG capsule; Take 1 capsule (100 mg) by mouth 2 times daily  Dispense: 180 capsule; Refill: 3    3. At risk for breastfeeding difficulty  Reviewed importance of deep latch to prevent mastitis.      4. Anemia due to blood loss, acute  Continue with iron and pnv  High iron diet.      Cbc done today.   - Hemoglobin; Future  - Hemoglobin    Reviewed signs of preeclampsia and when to be seen.      Follow up with ob at [facility].

## 2022-02-25 NOTE — PATIENT INSTRUCTIONS
"Patient Education     Holding Your Baby While Breastfeeding      \"Laid-back\" position     Comfort and position are the keys to successful breastfeeding. Learn how to position your baby correctly at the breast. Choose the hold that works best for both of you. You may need to change holds as your baby grows.     Always make sure your baby is tummy-to-tummy with you.    Laid-back  baby-led natural position  Lie back on a sofa, bed, or reclining chair so that your body is at a comfortable 45-degree angle, but not flat. This may be more comfortable than sitting up and leaning over a breastfeeding pillow.  Here are some tips:    Place your baby on his or her tummy on your chest. When your baby feels your body with the whole front of his or her body, it triggers his or her senses to find your nipple. Let your baby move over to the breast and latch on without your help. Your arms will make a \"nest\" around your baby.    When your baby attaches, make sure you see more areola above the upper lip than below the lower lip. This should help protect your nipples from soreness.  Other positions you can try       Cradle hold \"Football\" hold Side-lying hold   Cradle hold or  cross-cradle  hold  Here are some tips:    Sit upright. Make sure you have back support and that you are comfortable and relaxed. Raise your baby to breast height. Use a pillow under your baby s bottom. Put your baby on his or her side on the pillow so his or her tummy is touching your tummy. Use a chair with armrests for your arms.    Keep your knees level with your hips. Put a stool or pillow under your feet if needed.    Cradle your baby. Make sure your baby s body is well supported by your arm (cradle hold). Or use your hand to support the base of your baby's head and neck (cross-cradle hold).     Make sure your baby s body is facing and touching your body with your baby's head higher than his or her bottom. It is easier for your baby to swallow that " way.   Football  hold  You can use the football hold to breastfeed two babies at once.  Here are some tips:    Place a pillow at your side. Lay the baby s bottom on the pillow so that your baby's bottom is lower than his or her head. Hold your baby's neck so that your fingers are below his or her ears.     Make sure your baby's body is on his or her side so the whole front of your baby's body is touching yours.    Tuck your baby s legs between your arm and body, as if you were clutching a football or purse at your side.  Side-lying hold  Here are some tips:    Stretch out on your side. Using pillows to support your head, neck, and back. Place your baby on his or her side facing you so that the front part of your baby's body is against your body.    Support your baby s head, neck, and back with your arm.    Let your baby find the nipple and attach without help.    Switch breasts. Gather your baby close to your chest. Then roll onto your other side to feed the same way from the other breast.    It is always possible to fall asleep while nursing, so make sure you are in a safe place when you use this hold. Do not use a couch. Follow your healthcare provider's advice about a safe sleep environment for your baby.  [employer] last reviewed this educational content on 3/1/2017    1993-9884 [employer]. All rights reserved. This information is not intended as a substitute for professional medical care. Always follow your healthcare professional's instructions.

## 2022-06-30 ENCOUNTER — HOSPITAL ENCOUNTER (OUTPATIENT)
Dept: GENERAL RADIOLOGY | Facility: HOSPITAL | Age: 37
Discharge: HOME | End: 2022-06-30
Attending: FAMILY MEDICINE | Admitting: FAMILY MEDICINE
Payer: COMMERCIAL

## 2022-06-30 ENCOUNTER — OFFICE VISIT (OUTPATIENT)
Dept: FAMILY MEDICINE | Facility: CLINIC | Age: 37
End: 2022-06-30
Payer: COMMERCIAL

## 2022-06-30 VITALS
BODY MASS INDEX: 16.93 KG/M2 | DIASTOLIC BLOOD PRESSURE: 57 MMHG | OXYGEN SATURATION: 97 % | WEIGHT: 81 LBS | RESPIRATION RATE: 16 BRPM | SYSTOLIC BLOOD PRESSURE: 92 MMHG | HEART RATE: 106 BPM | TEMPERATURE: 100.2 F

## 2022-06-30 DIAGNOSIS — J98.4 RESTRICTIVE LUNG DISEASE: ICD-10-CM

## 2022-06-30 DIAGNOSIS — R05.9 COUGH: ICD-10-CM

## 2022-06-30 DIAGNOSIS — R05.9 COUGH: Primary | ICD-10-CM

## 2022-06-30 PROCEDURE — 99214 OFFICE O/P EST MOD 30 MIN: CPT | Performed by: FAMILY MEDICINE

## 2022-06-30 PROCEDURE — 71046 X-RAY EXAM CHEST 2 VIEWS: CPT | Mod: FY

## 2022-06-30 RX ORDER — AZITHROMYCIN 250 MG/1
TABLET, FILM COATED ORAL
Qty: 6 TABLET | Refills: 0 | Status: SHIPPED | OUTPATIENT
Start: 2022-06-30 | End: 2022-07-05

## 2022-06-30 NOTE — PROGRESS NOTES
Assessment:       Cough  - XR Chest 2 Views  - amoxicillin-clavulanate (AUGMENTIN) 875-125 MG tablet  Dispense: 14 tablet; Refill: 0  - azithromycin (ZITHROMAX) 250 MG tablet  Dispense: 6 tablet; Refill: 0    Restrictive lung disease    Plan:     Patient with 1 month history of cough that seems to be getting worse as well is a low-grade fever.  She does have known restrictive lung disease on the right side.  Chest x-ray ordered and personally reviewed by myself again showing chronic changes on the right side however given her worsening cough and symptoms and low-grade fever I did elect to treat with Augmentin and Zithromax as I do think an overlying pneumonia would be very difficult to visualize on chest x-ray.  Prescription sent to pharmacy.  Follow-up if symptoms getting worse or not improving in 1 week.    MEDICATIONS:   Orders Placed This Encounter   Medications     amoxicillin-clavulanate (AUGMENTIN) 875-125 MG tablet     Sig: Take 1 tablet by mouth 2 times daily for 7 days     Dispense:  14 tablet     Refill:  0     azithromycin (ZITHROMAX) 250 MG tablet     Sig: Take 2 tablets (500 mg) by mouth daily for 1 day, THEN 1 tablet (250 mg) daily for 4 days.     Dispense:  6 tablet     Refill:  0         Subjective:       37 year old female presents for evaluation of a 1 month history of productive cough.  She has been feeling very rundown without much of an appetite.  No shortness of breath.  She has had a low-grade fever.  She has been having some mid back pain on the right.  Has taken Tylenol for her symptoms but nothing else.  Denies much nasal congestion, ear pain, or throat.      Patient Active Problem List   Diagnosis     Vitamin D Deficiency     Scoliosis     Amenorrhea     Restrictive lung disease     Elevated WBC count     Seasonal allergies     SVT (supraventricular tachycardia) (H)     Anemia, chronic disease       Past Medical History:   Diagnosis Date     Abnormal CXR (chest x-ray) 12/2017    right  chest volume loss     Anemia, chronic disease 1/9/2020     Scoliosis      SVT (supraventricular tachycardia) (H)      Tuberculosis     had pulmonary TB in 2001, received 4 drug regimen.        Past Surgical History:   Procedure Laterality Date     OTHER SURGICAL HISTORY  04/2016    ablation for svt     OTHER SURGICAL HISTORY      AVNRT ablation     TN LAP,APPENDECTOMY N/A 7/5/2019    Procedure: APPENDECTOMY, LAPAROSCOPIC;  Surgeon: Payam Tran MD;  [place] [facility] [place]  Service: General       Current Outpatient Medications   Medication     acetaminophen (TYLENOL EXTRA STRENGTH) 500 MG tablet     docusate sodium (COLACE) 100 MG capsule     doxylamine (UNISOM) 25 MG TABS tablet     EPINEPHrine (EPIPEN) 0.3 mg/0.3 mL injection     ferrous sulfate (FEROSUL) 325 (65 Fe) MG tablet     meclizine (ANTIVERT) 25 mg tablet     metoclopramide (REGLAN) 10 MG tablet     mirtazapine (REMERON) 7.5 MG tablet     multivitamin with minerals tablet     omeprazole (PRILOSEC) 20 MG capsule     ondansetron (ZOFRAN-ODT) 4 MG disintegrating tablet     prenatal vit no.130-iron-folic (PRENATAL VITAMIN) 27 mg iron- 800 mcg Tab tablet     pyridOXINE (VITAMIN B6) 25 MG tablet     No current facility-administered medications for this visit.       Allergies   Allergen Reactions     Bee Venom Protein (Honey Bee) [Bee Venom] Anaphylaxis and Itching     Other Food Allergy Anaphylaxis     Frog and Turtle     Shellfish Allergy Hives     Wasp Venom [Wasp Venom Protein] Anaphylaxis and Itching       Family History   Problem Relation Age of Onset     No Known Problems Mother      Tuberculosis Father      No Known Problems Child      No Known Problems Child      No Known Problems Child        Social History     Socioeconomic History     Marital status: Single     Spouse name: None     Number of children: 3     Years of education: None     Highest education level: None   Tobacco Use     Smoking status: Never Smoker     Smokeless tobacco:  Never Used   Substance and Sexual Activity     Alcohol use: No     Drug use: No     Sexual activity: Yes     Partners: Male     Birth control/protection: Injection   Social History Narrative    She has 3 children.         Review of Systems  Pertinent items are noted in HPI.      Objective:                 General Appearance:    BP 92/57   Pulse 106   Temp 100.2  F (37.9  C) (Tympanic)   Resp 16   Wt 36.7 kg (81 lb)   SpO2 97%   BMI 16.93 kg/m          Alert, pleasant, cooperative, no distress, appears stated age   Head:    Normocephalic, without obvious abnormality, atraumatic   Eyes:    Conjunctiva/corneas clear   Ears:    Normal TM's without erythema or bulging. Normal external ear canals, both ears   Nose:   Nares normal, septum midline, mucosa normal, no drainage    or sinus tenderness   Throat:   Lips, mucosa, and tongue normal; teeth and gums normal.  No tonsilar hypertrophy or exudate.   Neck:   Supple, symmetrical, trachea midline, no adenopathy    Lungs:    Patient with rhonchi in the right mid and lower lung fields.  No wheezes.  Otherwise clear.    Heart:    Regular rate and rhythm, S1 and S2 normal, no murmur, rub or gallop       Extremities:   Extremities normal, atraumatic, no cyanosis or edema   Skin:   Skin color, texture, turgor normal, no rashes or lesions           Results for orders placed or performed during the hospital encounter of 06/30/22   XR Chest 2 Views     Status: None    Narrative    EXAM: XR CHEST 2 VW  LOCATION: [facility]  DATE/TIME: 6/30/2022 11:49 AM    INDICATION:  Cough  COMPARISON: 02/13/2022, 12/19/2017      Impression    IMPRESSION: Chronic diffuse opacification/consolidation and volume loss right hemithorax unchanged. Heart size obscured by opacified right hemithorax. Normal pulmonary vascularity. Left lung is clear with no pneumothorax nor pleural effusion.       This note has been dictated using voice recognition software. Any grammatical or  context distortions are unintentional and inherent to the software

## 2022-06-30 NOTE — PATIENT INSTRUCTIONS
I have sent 2 antibiotics over to your pharmacy for treatment of your cough.  Please follow-up if your symptoms are not getting better.

## 2022-10-17 ENCOUNTER — OFFICE VISIT (OUTPATIENT)
Dept: FAMILY MEDICINE | Facility: CLINIC | Age: 37
End: 2022-10-17
Payer: COMMERCIAL

## 2022-10-17 VITALS
RESPIRATION RATE: 17 BRPM | BODY MASS INDEX: 16.72 KG/M2 | DIASTOLIC BLOOD PRESSURE: 57 MMHG | TEMPERATURE: 98.6 F | SYSTOLIC BLOOD PRESSURE: 91 MMHG | HEART RATE: 88 BPM | WEIGHT: 80 LBS

## 2022-10-17 DIAGNOSIS — R53.83 FATIGUE, UNSPECIFIED TYPE: Primary | ICD-10-CM

## 2022-10-17 LAB
BASOPHILS # BLD AUTO: 0.1 10E3/UL (ref 0–0.2)
BASOPHILS NFR BLD AUTO: 1 %
EOSINOPHIL # BLD AUTO: 0.2 10E3/UL (ref 0–0.7)
EOSINOPHIL NFR BLD AUTO: 2 %
ERYTHROCYTE [DISTWIDTH] IN BLOOD BY AUTOMATED COUNT: 13.2 % (ref 10–15)
FERRITIN SERPL-MCNC: 242 NG/ML (ref 6–175)
HCT VFR BLD AUTO: 38.4 % (ref 35–47)
HGB BLD-MCNC: 12 G/DL (ref 11.7–15.7)
IMM GRANULOCYTES # BLD: 0 10E3/UL
IMM GRANULOCYTES NFR BLD: 0 %
LYMPHOCYTES # BLD AUTO: 1.9 10E3/UL (ref 0.8–5.3)
LYMPHOCYTES NFR BLD AUTO: 22 %
MCH RBC QN AUTO: 27.3 PG (ref 26.5–33)
MCHC RBC AUTO-ENTMCNC: 31.3 G/DL (ref 31.5–36.5)
MCV RBC AUTO: 88 FL (ref 78–100)
MONOCYTES # BLD AUTO: 0.5 10E3/UL (ref 0–1.3)
MONOCYTES NFR BLD AUTO: 6 %
NEUTROPHILS # BLD AUTO: 5.9 10E3/UL (ref 1.6–8.3)
NEUTROPHILS NFR BLD AUTO: 69 %
PLATELET # BLD AUTO: 370 10E3/UL (ref 150–450)
RBC # BLD AUTO: 4.39 10E6/UL (ref 3.8–5.2)
TSH SERPL DL<=0.005 MIU/L-ACNC: 1.97 UIU/ML (ref 0.3–4.2)
WBC # BLD AUTO: 8.6 10E3/UL (ref 4–11)

## 2022-10-17 PROCEDURE — 82728 ASSAY OF FERRITIN: CPT | Performed by: FAMILY MEDICINE

## 2022-10-17 PROCEDURE — 99213 OFFICE O/P EST LOW 20 MIN: CPT | Performed by: FAMILY MEDICINE

## 2022-10-17 PROCEDURE — 80050 GENERAL HEALTH PANEL: CPT | Performed by: FAMILY MEDICINE

## 2022-10-17 PROCEDURE — 36415 COLL VENOUS BLD VENIPUNCTURE: CPT | Performed by: FAMILY MEDICINE

## 2022-10-17 NOTE — LETTER
October 21, 2022      Shahbaz Nam  [address] [place] [address]        Dear ,    We are writing to inform you of your test results.    Your test results fall within the expected range(s) or remain unchanged from previous results.  Please continue with current treatment plan.    Resulted Orders   **Comprehensive metabolic panel FUTURE 2mo   Result Value Ref Range    Sodium 138 136 - 145 mmol/L    Potassium 3.7 3.4 - 5.3 mmol/L    Chloride 101 98 - 107 mmol/L    Carbon Dioxide (CO2) 22 22 - 29 mmol/L    Anion Gap 15 7 - 15 mmol/L    Urea Nitrogen 16.1 6.0 - 20.0 mg/dL    Creatinine 0.87 0.51 - 0.95 mg/dL    Calcium 9.0 8.6 - 10.0 mg/dL    Glucose 72 70 - 99 mg/dL    Alkaline Phosphatase 91 35 - 104 U/L    AST 30 10 - 35 U/L    ALT 14 10 - 35 U/L    Protein Total 8.0 6.4 - 8.3 g/dL    Albumin 4.4 3.5 - 5.2 g/dL    Bilirubin Total 0.7 <=1.2 mg/dL    GFR Estimate 86 >60 mL/min/1.73m2      Comment:      Effective December 21, 2021 eGFRcr in adults is calculated using the 2021 CKD-EPI creatinine equation which includes age and gender (Mylene et al., [place], DOI: 10.1056/GKDTzh3720801)   **Ferritin FUTURE 2mo   Result Value Ref Range    Ferritin 242 (H) 6 - 175 ng/mL   **TSH with free T4 reflex FUTURE 2mo   Result Value Ref Range    TSH 1.97 0.30 - 4.20 uIU/mL   CBC with platelets and differential   Result Value Ref Range    WBC Count 8.6 4.0 - 11.0 10e3/uL    RBC Count 4.39 3.80 - 5.20 10e6/uL    Hemoglobin 12.0 11.7 - 15.7 g/dL    Hematocrit 38.4 35.0 - 47.0 %    MCV 88 78 - 100 fL    MCH 27.3 26.5 - 33.0 pg    MCHC 31.3 (L) 31.5 - 36.5 g/dL    RDW 13.2 10.0 - 15.0 %    Platelet Count 370 150 - 450 10e3/uL    % Neutrophils 69 %    % Lymphocytes 22 %    % Monocytes 6 %    % Eosinophils 2 %    % Basophils 1 %    % Immature Granulocytes 0 %    Absolute Neutrophils 5.9 1.6 - 8.3 10e3/uL    Absolute Lymphocytes 1.9 0.8 - 5.3 10e3/uL    Absolute Monocytes 0.5 0.0 - 1.3 10e3/uL    Absolute Eosinophils 0.2 0.0 - 0.7  10e3/uL    Absolute Basophils 0.1 0.0 - 0.2 10e3/uL    Absolute Immature Granulocytes 0.0 <=0.4 10e3/uL       If you have any questions or concerns, please call the clinic at the number listed above.       Sincerely,      Vishal Zuniga MD

## 2022-10-17 NOTE — PROGRESS NOTES
Assessment & Plan     Fatigue, unspecified type  Differential diagnosis discussed, we will check her hemoglobin since she has been spotting recently.  - REVIEW OF HEALTH MAINTENANCE PROTOCOL ORDERS  - **CBC with platelets differential FUTURE 2mo; Future  - **Comprehensive metabolic panel FUTURE 2mo; Future  - **Ferritin FUTURE 2mo; Future  - **TSH with free T4 reflex FUTURE 2mo; Future  - **CBC with platelets differential FUTURE 2mo  - **Comprehensive metabolic panel FUTURE 2mo  - **Ferritin FUTURE 2mo  - **TSH with free T4 reflex FUTURE 2mo  Patient also wanted her Nexplanon removed without I do not do that, I did refer her to see provider who will do that for her.  Review of external notes as documented elsewhere in note  20 minutes spent on the date of the encounter doing chart review, review of outside records, review of test results, interpretation of tests, patient visit and documentation            No follow-ups on file.    Vishal Zuniga MD  [facility]    Subjective   Ta is a 37 year old accompanied by her daughter, presenting for the following health issues:  Contraception (Pt is here wanting nexplanon removed. Pt stated that she is not having any pain just wants it out.)      Contraception    History of Present Illness       Reason for visit:  For coughing and getting tired easy and also for nexplono take out    She eats 2-3 servings of fruits and vegetables daily.She consumes 1 sweetened beverage(s) daily.She exercises with enough effort to increase her heart rate 9 or less minutes per day.  She exercises with enough effort to increase her heart rate 3 or less days per week.   She is taking medications regularly.     Nexplanon was placed about 9 months ago, she is feeling that she been feeling tired, dizzy spotting all the time and she would like that removed.  Also complaining of fatigue, and again as she has a history of anemia would like her hemoglobin to be checked.  Not  currently taking any medication.    Review of Systems   Constitutional, HEENT, cardiovascular, pulmonary, gi and gu systems are negative, except as otherwise noted.      Objective    BP 91/57 (BP Location: Right arm, Patient Position: Sitting, Cuff Size: Adult Small)   Pulse 88   Temp 98.6  F (37  C) (Temporal)   Resp 17   Wt 36.3 kg (80 lb)   LMP  (LMP Unknown)   BMI 16.72 kg/m    Body mass index is 16.72 kg/m .  Physical Exam   GENERAL: healthy, alert and no distress  NECK: no adenopathy, no asymmetry, masses, or scars and thyroid normal to palpation  RESP: lungs clear to auscultation - no rales, rhonchi or wheezes  CV: regular rate and rhythm, normal S1 S2, no S3 or S4, no murmur, click or rub, no peripheral edema and peripheral pulses strong  ABDOMEN: soft, nontender, no hepatosplenomegaly, no masses and bowel sounds normal  MS: no gross musculoskeletal defects noted, no edema    Results for orders placed or performed in visit on 10/17/22   CBC with platelets and differential     Status: Abnormal   Result Value Ref Range    WBC Count 8.6 4.0 - 11.0 10e3/uL    RBC Count 4.39 3.80 - 5.20 10e6/uL    Hemoglobin 12.0 11.7 - 15.7 g/dL    Hematocrit 38.4 35.0 - 47.0 %    MCV 88 78 - 100 fL    MCH 27.3 26.5 - 33.0 pg    MCHC 31.3 (L) 31.5 - 36.5 g/dL    RDW 13.2 10.0 - 15.0 %    Platelet Count 370 150 - 450 10e3/uL    % Neutrophils 69 %    % Lymphocytes 22 %    % Monocytes 6 %    % Eosinophils 2 %    % Basophils 1 %    % Immature Granulocytes 0 %    Absolute Neutrophils 5.9 1.6 - 8.3 10e3/uL    Absolute Lymphocytes 1.9 0.8 - 5.3 10e3/uL    Absolute Monocytes 0.5 0.0 - 1.3 10e3/uL    Absolute Eosinophils 0.2 0.0 - 0.7 10e3/uL    Absolute Basophils 0.1 0.0 - 0.2 10e3/uL    Absolute Immature Granulocytes 0.0 <=0.4 10e3/uL   **CBC with platelets differential FUTURE 2mo     Status: Abnormal    Narrative    The following orders were created for panel order **CBC with platelets differential FUTURE 2mo.  Procedure                                Abnormality         Status                     ---------                               -----------         ------                     CBC with platelets and d...[[id number]]  Abnormal            Final result                 Please view results for these tests on the individual orders.

## 2022-10-18 LAB
ALBUMIN SERPL BCG-MCNC: 4.4 G/DL (ref 3.5–5.2)
ALP SERPL-CCNC: 91 U/L (ref 35–104)
ALT SERPL W P-5'-P-CCNC: 14 U/L (ref 10–35)
ANION GAP SERPL CALCULATED.3IONS-SCNC: 15 MMOL/L (ref 7–15)
AST SERPL W P-5'-P-CCNC: 30 U/L (ref 10–35)
BILIRUB SERPL-MCNC: 0.7 MG/DL
BUN SERPL-MCNC: 16.1 MG/DL (ref 6–20)
CALCIUM SERPL-MCNC: 9 MG/DL (ref 8.6–10)
CHLORIDE SERPL-SCNC: 101 MMOL/L (ref 98–107)
CREAT SERPL-MCNC: 0.87 MG/DL (ref 0.51–0.95)
DEPRECATED HCO3 PLAS-SCNC: 22 MMOL/L (ref 22–29)
GFR SERPL CREATININE-BSD FRML MDRD: 86 ML/MIN/1.73M2
GLUCOSE SERPL-MCNC: 72 MG/DL (ref 70–99)
POTASSIUM SERPL-SCNC: 3.7 MMOL/L (ref 3.4–5.3)
PROT SERPL-MCNC: 8 G/DL (ref 6.4–8.3)
SODIUM SERPL-SCNC: 138 MMOL/L (ref 136–145)

## 2022-11-03 ENCOUNTER — OFFICE VISIT (OUTPATIENT)
Dept: FAMILY MEDICINE | Facility: CLINIC | Age: 37
End: 2022-11-03
Payer: COMMERCIAL

## 2022-11-03 VITALS
OXYGEN SATURATION: 97 % | DIASTOLIC BLOOD PRESSURE: 66 MMHG | HEART RATE: 76 BPM | WEIGHT: 79 LBS | SYSTOLIC BLOOD PRESSURE: 93 MMHG | RESPIRATION RATE: 17 BRPM | BODY MASS INDEX: 16.51 KG/M2 | TEMPERATURE: 98.1 F

## 2022-11-03 DIAGNOSIS — N92.6 MISSED PERIOD: Primary | ICD-10-CM

## 2022-11-03 LAB — HCG UR QL: NEGATIVE

## 2022-11-03 PROCEDURE — 90471 IMMUNIZATION ADMIN: CPT | Performed by: PHYSICIAN ASSISTANT

## 2022-11-03 PROCEDURE — 90686 IIV4 VACC NO PRSV 0.5 ML IM: CPT | Performed by: PHYSICIAN ASSISTANT

## 2022-11-03 PROCEDURE — 81025 URINE PREGNANCY TEST: CPT | Performed by: PHYSICIAN ASSISTANT

## 2022-11-03 NOTE — PROGRESS NOTES
Patient had Nexplanon placed in February at an outside clinic.  She wants Nexplanon removed because she is having frequent bleeding.  She wants an IUD instead.  She has had an IUD in the past.  Unfortunately, I am not trained to place IUDs.  We discussed taking Nexplanon out today, then returning for an IUD visit, or  Rescheduling and hopefully having everything done all at once, or at least all on the same day.  Patient would like everything done on the same day if possible.  Patient scheduled for IUD insertion visit with Dr. Fields in the next few weeks.  If Dr. Fields is also able to do Nexplanon removal that same day she can do it, otherwise I can add patient onto my schedule that same day and get that done.  Flu shot was given today.    No charge for visit today.

## 2022-11-14 ENCOUNTER — OFFICE VISIT (OUTPATIENT)
Dept: FAMILY MEDICINE | Facility: CLINIC | Age: 37
End: 2022-11-14
Payer: COMMERCIAL

## 2022-11-14 VITALS
HEIGHT: 57 IN | OXYGEN SATURATION: 98 % | SYSTOLIC BLOOD PRESSURE: 90 MMHG | HEART RATE: 86 BPM | TEMPERATURE: 97.3 F | RESPIRATION RATE: 21 BRPM | WEIGHT: 79 LBS | DIASTOLIC BLOOD PRESSURE: 58 MMHG | BODY MASS INDEX: 17.04 KG/M2

## 2022-11-14 DIAGNOSIS — Z30.430 ENCOUNTER FOR IUD INSERTION: Primary | ICD-10-CM

## 2022-11-14 DIAGNOSIS — Z30.46 ENCOUNTER FOR NEXPLANON REMOVAL: ICD-10-CM

## 2022-11-14 PROCEDURE — 58300 INSERT INTRAUTERINE DEVICE: CPT | Mod: 59 | Performed by: FAMILY MEDICINE

## 2022-11-14 PROCEDURE — 11982 REMOVE DRUG IMPLANT DEVICE: CPT | Performed by: FAMILY MEDICINE

## 2022-11-14 NOTE — PROGRESS NOTES
1. Encounter for IUD insertion  2. Encounter for Nexplanon removal  This is a 36 yo female with 4 children - oldest is 18, youngest is 9 months old.  Does not desire more children (at least not in foreseeable future).  Has Nexplanon - doesn't like it, doesn't like bleeding.  Discussed with patient.  She would like Mirena.    GYN: Insert IUD    Date/Time: 11/14/2022 5:30 PM  Performed by: Elo Valdivia MD  Authorized by: Elo Valdivia MD     Consent:     Consent obtained:  Written and verbal    Consent given by:  Patient    Procedure risks and benefits discussed: yes      Patient questions answered: yes      Patient agrees, verbalizes understanding, and wants to proceed: yes      Educational handouts given: yes      Instructions and paperwork completed: yes    Procedure:     Pelvic exam performed: yes      Cervix cleaned and prepped: yes      Speculum placed in vagina: yes      Tenaculum applied to cervix: yes      Uterus sounded: yes      Uterus sound depth (cm):  7.5    IUD inserted with no complications: yes      IUD type:  Mirena    Strings trimmed: yes (1.5 cm)    Post-procedure:     Patient tolerated procedure well: yes      Patient will follow up after next period: yes    Comments:      Initial difficult sounding uterus, but then sound and inserting device both advanced easily  GYN: Remove Contraceptive Implant - Nexplanon/Implanon    Date/Time: 11/14/2022 11:55 PM  Performed by: Elo Valdivia MD  Authorized by: Elo Valdivia MD   Preparation: Patient was prepped and draped in the usual sterile fashion.  Local anesthesia used: yes  Anesthesia: local infiltration    Anesthesia:  Local anesthesia used: yes  Local Anesthetic: lidocaine 1% with epinephrine  Anesthetic total: 1 mL    Sedation:  Patient sedated: no    Patient tolerance: patient tolerated the procedure well with no immediate complications  Comments: Nexplanon removed - demonstrated  "intact to patient.                 Subjective   Ta is a 37 year old accompanied by her spouse, presenting for the following health issues:  Nexplanon removal and IUD insertion      Desires Nexplanon removal  Reviewed Paragard vs. Mirena - has had Mirena in past, desires             Review of Systems   All other systems reviewed and are negative.           Objective    BP 90/58 (BP Location: Left arm, Patient Position: Sitting, Cuff Size: Adult Small)   Pulse 86   Temp 97.3  F (36.3  C) (Temporal)   Resp 21   Ht 1.445 m (4' 8.89\")   Wt 35.8 kg (79 lb)   LMP 10/26/2022   SpO2 98%   BMI 17.16 kg/m    Body mass index is 17.16 kg/m .  Physical Exam  Vitals reviewed.   Constitutional:       General: She is not in acute distress.     Appearance: Normal appearance.   HENT:      Head: Normocephalic.      Right Ear: Tympanic membrane, ear canal and external ear normal.      Left Ear: Tympanic membrane, ear canal and external ear normal.      Nose: Nose normal.      Mouth/Throat:      Mouth: Mucous membranes are moist.      Pharynx: No posterior oropharyngeal erythema.   Eyes:      Extraocular Movements: Extraocular movements intact.      Conjunctiva/sclera: Conjunctivae normal.      Pupils: Pupils are equal, round, and reactive to light.   Cardiovascular:      Rate and Rhythm: Normal rate and regular rhythm.      Pulses: Normal pulses.      Heart sounds: Normal heart sounds. No murmur heard.  Pulmonary:      Effort: Pulmonary effort is normal.      Breath sounds: Normal breath sounds.   Abdominal:      Palpations: Abdomen is soft. There is no mass.      Tenderness: There is no abdominal tenderness. There is no guarding or rebound.   Genitourinary:     Comments: External genitalia normal  Musculoskeletal:         General: No deformity. Normal range of motion.      Cervical back: Normal range of motion and neck supple.   Lymphadenopathy:      Cervical: No cervical adenopathy.   Skin:     General: Skin is warm and dry. "   Neurological:      General: No focal deficit present.      Mental Status: She is alert.   Psychiatric:         Mood and Affect: Mood normal.         Behavior: Behavior normal.

## 2022-11-15 RX ORDER — LIDOCAINE HYDROCHLORIDE AND EPINEPHRINE 10; 10 MG/ML; UG/ML
2 INJECTION, SOLUTION INFILTRATION; PERINEURAL ONCE
Status: COMPLETED | OUTPATIENT
Start: 2022-11-15 | End: 2022-11-15

## 2022-11-15 RX ADMIN — LIDOCAINE HYDROCHLORIDE AND EPINEPHRINE 2 ML: 10; 10 INJECTION, SOLUTION INFILTRATION; PERINEURAL at 15:16

## 2023-01-08 ENCOUNTER — OFFICE VISIT (OUTPATIENT)
Dept: FAMILY MEDICINE | Facility: CLINIC | Age: 38
End: 2023-01-08
Payer: COMMERCIAL

## 2023-01-08 VITALS
SYSTOLIC BLOOD PRESSURE: 107 MMHG | OXYGEN SATURATION: 97 % | BODY MASS INDEX: 17.27 KG/M2 | TEMPERATURE: 98.2 F | WEIGHT: 79.5 LBS | DIASTOLIC BLOOD PRESSURE: 70 MMHG | RESPIRATION RATE: 21 BRPM | HEART RATE: 73 BPM

## 2023-01-08 DIAGNOSIS — R07.0 THROAT PAIN: ICD-10-CM

## 2023-01-08 DIAGNOSIS — R05.1 ACUTE COUGH: ICD-10-CM

## 2023-01-08 DIAGNOSIS — M54.50 LUMBAR BACK PAIN: ICD-10-CM

## 2023-01-08 DIAGNOSIS — M54.2 NECK PAIN: ICD-10-CM

## 2023-01-08 DIAGNOSIS — J10.1 INFLUENZA B: Primary | ICD-10-CM

## 2023-01-08 LAB
DEPRECATED S PYO AG THROAT QL EIA: NEGATIVE
FLUAV AG SPEC QL IA: NEGATIVE
FLUBV AG SPEC QL IA: POSITIVE
GROUP A STREP BY PCR: NOT DETECTED

## 2023-01-08 PROCEDURE — 99214 OFFICE O/P EST MOD 30 MIN: CPT | Performed by: FAMILY MEDICINE

## 2023-01-08 PROCEDURE — U0003 INFECTIOUS AGENT DETECTION BY NUCLEIC ACID (DNA OR RNA); SEVERE ACUTE RESPIRATORY SYNDROME CORONAVIRUS 2 (SARS-COV-2) (CORONAVIRUS DISEASE [COVID-19]), AMPLIFIED PROBE TECHNIQUE, MAKING USE OF HIGH THROUGHPUT TECHNOLOGIES AS DESCRIBED BY CMS-2020-01-R: HCPCS | Performed by: FAMILY MEDICINE

## 2023-01-08 PROCEDURE — U0005 INFEC AGEN DETEC AMPLI PROBE: HCPCS | Performed by: FAMILY MEDICINE

## 2023-01-08 PROCEDURE — 87804 INFLUENZA ASSAY W/OPTIC: CPT | Performed by: FAMILY MEDICINE

## 2023-01-08 PROCEDURE — 87651 STREP A DNA AMP PROBE: CPT | Performed by: FAMILY MEDICINE

## 2023-01-08 RX ORDER — OSELTAMIVIR PHOSPHATE 75 MG/1
75 CAPSULE ORAL 2 TIMES DAILY
Qty: 10 CAPSULE | Refills: 0 | Status: SHIPPED | OUTPATIENT
Start: 2023-01-08 | End: 2023-01-13

## 2023-01-08 NOTE — PROGRESS NOTES
Assessment:       Influenza B    Acute cough  - Symptomatic COVID-19 Virus (Coronavirus) by PCR Nose  - Influenza A & B Antigen - Clinic Collect    Throat pain  - Streptococcus A Rapid Screen w/Reflex to PCR - Clinic Collect  - Group A Streptococcus PCR Throat Swab    Neck pain    Lumbar back pain    Neck pain  Lumbar back pain     Plan:     Patient with influenza B.  Given her history of restrictive lung disease and SVT, she is at higher risk for complications so  will with Tamiflu.  Prescription sent to pharmacy.  Recommend ibuprofen, Tylenol, rest, fluids.  Follow-up if symptoms are getting worse or not improving over the next week.    MEDICATIONS:   Orders Placed This Encounter   Medications     oseltamivir (TAMIFLU) 75 MG capsule     Sig: Take 1 capsule (75 mg) by mouth 2 times daily for 5 days     Dispense:  10 capsule     Refill:  0         Subjective:       38 year old female presents for evaluation of a 1 week history of nasal congestion and cough.  She has not had a fever.  She has been feeling very rundown.  She has had a very mild sore throat but not significantly so.  Denies ear pain.  Patient also with neck and back achiness and overall body aches.  She has had some chills and body aches.      Patient Active Problem List   Diagnosis     Vitamin D Deficiency     Scoliosis     Amenorrhea     Restrictive lung disease     Elevated WBC count     Seasonal allergies     SVT (supraventricular tachycardia) (H)     Anemia, chronic disease       Past Medical History:   Diagnosis Date     Abnormal CXR (chest x-ray) 12/2017    right chest volume loss     Anemia, chronic disease 1/9/2020     Scoliosis      SVT (supraventricular tachycardia) (H)      Tuberculosis     had pulmonary TB in 2001, received 4 drug regimen.        Past Surgical History:   Procedure Laterality Date     OTHER SURGICAL HISTORY  04/2016    ablation for svt     OTHER SURGICAL HISTORY      AVNRT ablation     MD LAP,APPENDECTOMY N/A 7/5/2019     Procedure: APPENDECTOMY, LAPAROSCOPIC;  Surgeon: Payam Tran MD;  Location: [facility];  Service: General       Current Outpatient Medications   Medication     acetaminophen (TYLENOL EXTRA STRENGTH) 500 MG tablet     docusate sodium (COLACE) 100 MG capsule     doxylamine (UNISOM) 25 MG TABS tablet     EPINEPHrine (EPIPEN) 0.3 mg/0.3 mL injection     ferrous sulfate (FEROSUL) 325 (65 Fe) MG tablet     meclizine (ANTIVERT) 25 mg tablet     metoclopramide (REGLAN) 10 MG tablet     mirtazapine (REMERON) 7.5 MG tablet     multivitamin with minerals tablet     omeprazole (PRILOSEC) 20 MG capsule     ondansetron (ZOFRAN-ODT) 4 MG disintegrating tablet     prenatal vit no.130-iron-folic (PRENATAL VITAMIN) 27 mg iron- 800 mcg Tab tablet     pyridOXINE (VITAMIN B6) 25 MG tablet     No current facility-administered medications for this visit.       Allergies   Allergen Reactions     Bee Venom Protein (Honey Bee) [Bee Venom] Anaphylaxis and Itching     Other Food Allergy Anaphylaxis     Frog and Turtle     Shellfish Allergy Hives     Wasp Venom [Wasp Venom Protein] Anaphylaxis and Itching       Family History   Problem Relation Age of Onset     No Known Problems Mother      Tuberculosis Father      No Known Problems Child      No Known Problems Child      No Known Problems Child        Social History     Socioeconomic History     Marital status: Single     Spouse name: None     Number of children: 3     Years of education: None     Highest education level: None   Tobacco Use     Smoking status: Never     Smokeless tobacco: Never   Substance and Sexual Activity     Alcohol use: No     Drug use: No     Sexual activity: Yes     Partners: Male     Birth control/protection: Injection   Social History Narrative    She has 3 children.         Review of Systems  Pertinent items are noted in HPI.      Objective:                     General Appearance:    /70 (BP Location: Right arm, Patient Position: Sitting, Cuff  Size: Adult Small)   Pulse 73   Temp 98.2  F (36.8  C) (Oral)   Resp 21   Wt 36.1 kg (79 lb 8 oz)   SpO2 97%   BMI 17.27 kg/m          Alert, pleasant, cooperative, no distress, appears stated age   Head:    Normocephalic, without obvious abnormality, atraumatic   Eyes:    Conjunctiva/corneas clear   Ears:    Normal TM's without erythema or bulging. Normal external ear canals, both ears   Nose:   Nares normal, septum midline, mucosa normal, no drainage    or sinus tenderness   Throat:   Lips, mucosa, and tongue normal; teeth and gums normal.  No tonsilar hypertrophy or exudate.   Neck:   Supple, symmetrical, trachea midline, no adenopathy.  Patient noted to have some tenderness and tightness of the paracervical muscles bilaterally.  No vertebral tenderness.   Lungs:     Clear to auscultation bilaterally without wheezes, rales, or rhonchi, respirations unlabored    Heart:    Regular rate and rhythm, S1 and S2 normal, no murmur, rub or gallop                                 Back:  No vertebral tenderness.  No CVA tenderness.  Patient does have some tightness and mild tenderness of the lumbar musculature bilaterally.   Extremities:   Extremities normal, atraumatic, no cyanosis or edema   Skin:   Skin color, texture, turgor normal, no rashes or lesions           Results for orders placed or performed in visit on 01/08/23   Influenza A & B Antigen - Clinic Collect     Status: Abnormal    Specimen: Nose; Swab   Result Value Ref Range    Influenza A antigen Negative Negative    Influenza B antigen Positive (A) Negative    Narrative    Test results must be correlated with clinical data. If necessary, results should be confirmed by a molecular assay or viral culture.   Streptococcus A Rapid Screen w/Reflex to PCR - Clinic Collect     Status: Normal    Specimen: Throat; Swab   Result Value Ref Range    Group A Strep antigen Negative Negative       This note has been dictated using voice recognition software. Any  grammatical or context distortions are unintentional and inherent to the software

## 2023-01-09 LAB — SARS-COV-2 RNA RESP QL NAA+PROBE: NEGATIVE

## 2023-03-13 ENCOUNTER — OFFICE VISIT (OUTPATIENT)
Dept: FAMILY MEDICINE | Facility: CLINIC | Age: 38
End: 2023-03-13
Payer: COMMERCIAL

## 2023-03-13 VITALS
RESPIRATION RATE: 14 BRPM | BODY MASS INDEX: 17.38 KG/M2 | DIASTOLIC BLOOD PRESSURE: 62 MMHG | HEART RATE: 91 BPM | SYSTOLIC BLOOD PRESSURE: 91 MMHG | TEMPERATURE: 98.1 F | OXYGEN SATURATION: 97 % | WEIGHT: 80 LBS

## 2023-03-13 DIAGNOSIS — R42 DIZZINESS: Primary | ICD-10-CM

## 2023-03-13 LAB
ANION GAP SERPL CALCULATED.3IONS-SCNC: 10 MMOL/L (ref 7–15)
ATRIAL RATE - MUSE: 71 BPM
BUN SERPL-MCNC: 19.4 MG/DL (ref 6–20)
CALCIUM SERPL-MCNC: 8.9 MG/DL (ref 8.6–10)
CHLORIDE SERPL-SCNC: 106 MMOL/L (ref 98–107)
CREAT SERPL-MCNC: 0.68 MG/DL (ref 0.51–0.95)
DEPRECATED HCO3 PLAS-SCNC: 22 MMOL/L (ref 22–29)
DIASTOLIC BLOOD PRESSURE - MUSE: NORMAL MMHG
ERYTHROCYTE [DISTWIDTH] IN BLOOD BY AUTOMATED COUNT: 13.4 % (ref 10–15)
GFR SERPL CREATININE-BSD FRML MDRD: >90 ML/MIN/1.73M2
GLUCOSE SERPL-MCNC: 74 MG/DL (ref 70–99)
HCT VFR BLD AUTO: 39 % (ref 35–47)
HGB BLD-MCNC: 12.1 G/DL (ref 11.7–15.7)
INTERPRETATION ECG - MUSE: NORMAL
MCH RBC QN AUTO: 27.3 PG (ref 26.5–33)
MCHC RBC AUTO-ENTMCNC: 31 G/DL (ref 31.5–36.5)
MCV RBC AUTO: 88 FL (ref 78–100)
P AXIS - MUSE: 40 DEGREES
PLATELET # BLD AUTO: 289 10E3/UL (ref 150–450)
POTASSIUM SERPL-SCNC: 4 MMOL/L (ref 3.4–5.3)
PR INTERVAL - MUSE: 144 MS
QRS DURATION - MUSE: 74 MS
QT - MUSE: 374 MS
QTC - MUSE: 406 MS
R AXIS - MUSE: 16 DEGREES
RBC # BLD AUTO: 4.44 10E6/UL (ref 3.8–5.2)
SODIUM SERPL-SCNC: 138 MMOL/L (ref 136–145)
SYSTOLIC BLOOD PRESSURE - MUSE: NORMAL MMHG
T AXIS - MUSE: 51 DEGREES
VENTRICULAR RATE- MUSE: 71 BPM
WBC # BLD AUTO: 8.4 10E3/UL (ref 4–11)

## 2023-03-13 PROCEDURE — 93005 ELECTROCARDIOGRAM TRACING: CPT | Performed by: PHYSICIAN ASSISTANT

## 2023-03-13 PROCEDURE — 93010 ELECTROCARDIOGRAM REPORT: CPT | Performed by: GENERAL ACUTE CARE HOSPITAL

## 2023-03-13 PROCEDURE — 85027 COMPLETE CBC AUTOMATED: CPT | Performed by: PHYSICIAN ASSISTANT

## 2023-03-13 PROCEDURE — 36415 COLL VENOUS BLD VENIPUNCTURE: CPT | Performed by: PHYSICIAN ASSISTANT

## 2023-03-13 PROCEDURE — 99214 OFFICE O/P EST MOD 30 MIN: CPT | Performed by: PHYSICIAN ASSISTANT

## 2023-03-13 PROCEDURE — 80048 BASIC METABOLIC PNL TOTAL CA: CPT | Performed by: PHYSICIAN ASSISTANT

## 2023-03-13 RX ORDER — MECLIZINE HYDROCHLORIDE 25 MG/1
25 TABLET ORAL 3 TIMES DAILY PRN
Qty: 15 TABLET | Refills: 0 | Status: SHIPPED | OUTPATIENT
Start: 2023-03-13

## 2023-03-13 ASSESSMENT — ENCOUNTER SYMPTOMS
DIZZINESS: 1
APPETITE CHANGE: 1
VOMITING: 0
SHORTNESS OF BREATH: 1
DYSURIA: 0
WHEEZING: 0
PALPITATIONS: 0
NECK STIFFNESS: 1
FEVER: 0
FREQUENCY: 0
FATIGUE: 1

## 2023-03-13 NOTE — LETTER
March 13, 2023      Shahbaz Nam  [address] [place] [address]        To Whom It May Concern:    Shahbaz Nam  was seen on 3/13/23.  Please excuse her absence from work today due to illness.  Expected time away is 1 to 3 days.      Sincerely,        Ami Proctor PA-C

## 2023-03-13 NOTE — PATIENT INSTRUCTIONS
Drink plenty of water in case dehydration is contributing to your symptoms.  You will be notified of your lab results once they are all back.  Your EKG is looking normal.  Your vitals were stable in different positions.  Take meclizine up to 3 times per day as needed for dizziness relief.  Continue to be cautious when moving from sitting to standing position and to sit or lay down if you have sudden dizziness to avoid falls.  Please follow-up with primary care if symptoms fail to improve over the course the next 1 to 2 weeks.

## 2023-04-30 ENCOUNTER — OFFICE VISIT (OUTPATIENT)
Dept: FAMILY MEDICINE | Facility: CLINIC | Age: 38
End: 2023-04-30
Payer: COMMERCIAL

## 2023-04-30 VITALS
HEIGHT: 58 IN | OXYGEN SATURATION: 99 % | SYSTOLIC BLOOD PRESSURE: 104 MMHG | BODY MASS INDEX: 16.52 KG/M2 | TEMPERATURE: 100.7 F | DIASTOLIC BLOOD PRESSURE: 70 MMHG | WEIGHT: 78.7 LBS | HEART RATE: 103 BPM

## 2023-04-30 DIAGNOSIS — J02.0 ACUTE STREPTOCOCCAL PHARYNGITIS: Primary | ICD-10-CM

## 2023-04-30 DIAGNOSIS — Z20.822 SUSPECTED COVID-19 VIRUS INFECTION: ICD-10-CM

## 2023-04-30 DIAGNOSIS — J10.1 INFLUENZA B: ICD-10-CM

## 2023-04-30 DIAGNOSIS — K12.2 UVULITIS: ICD-10-CM

## 2023-04-30 DIAGNOSIS — H66.002 ACUTE SUPPURATIVE OTITIS MEDIA OF LEFT EAR WITHOUT SPONTANEOUS RUPTURE OF TYMPANIC MEMBRANE, RECURRENCE NOT SPECIFIED: ICD-10-CM

## 2023-04-30 LAB
DEPRECATED S PYO AG THROAT QL EIA: POSITIVE
FLUAV AG SPEC QL IA: NEGATIVE
FLUBV AG SPEC QL IA: POSITIVE

## 2023-04-30 PROCEDURE — 87880 STREP A ASSAY W/OPTIC: CPT | Performed by: FAMILY MEDICINE

## 2023-04-30 PROCEDURE — U0005 INFEC AGEN DETEC AMPLI PROBE: HCPCS | Performed by: FAMILY MEDICINE

## 2023-04-30 PROCEDURE — U0003 INFECTIOUS AGENT DETECTION BY NUCLEIC ACID (DNA OR RNA); SEVERE ACUTE RESPIRATORY SYNDROME CORONAVIRUS 2 (SARS-COV-2) (CORONAVIRUS DISEASE [COVID-19]), AMPLIFIED PROBE TECHNIQUE, MAKING USE OF HIGH THROUGHPUT TECHNOLOGIES AS DESCRIBED BY CMS-2020-01-R: HCPCS | Performed by: FAMILY MEDICINE

## 2023-04-30 PROCEDURE — 99214 OFFICE O/P EST MOD 30 MIN: CPT | Mod: CS | Performed by: FAMILY MEDICINE

## 2023-04-30 PROCEDURE — 87804 INFLUENZA ASSAY W/OPTIC: CPT | Performed by: FAMILY MEDICINE

## 2023-04-30 RX ORDER — AMOXICILLIN AND CLAVULANATE POTASSIUM 600; 42.9 MG/5ML; MG/5ML
57 POWDER, FOR SUSPENSION ORAL 2 TIMES DAILY
Qty: 170 ML | Refills: 0 | Status: SHIPPED | OUTPATIENT
Start: 2023-04-30 | End: 2023-05-10

## 2023-04-30 RX ORDER — ACETAMINOPHEN 500 MG
1000 TABLET ORAL EVERY 8 HOURS PRN
Qty: 30 TABLET | Refills: 0 | Status: SHIPPED | OUTPATIENT
Start: 2023-04-30

## 2023-04-30 RX ORDER — IBUPROFEN 200 MG
400 TABLET ORAL EVERY 6 HOURS PRN
Qty: 30 TABLET | Refills: 0 | Status: SHIPPED | OUTPATIENT
Start: 2023-04-30

## 2023-04-30 ASSESSMENT — PAIN SCALES - GENERAL: PAINLEVEL: MODERATE PAIN (4)

## 2023-04-30 NOTE — PATIENT INSTRUCTIONS
Start Augmentin-to treat your strep throat, swollen uvula, and left ear infection      Follow up as needed or if your symptoms worsen in any way.     Follow up with your primary care provider or clinic in about 2-3 days  if your symptoms do not improve

## 2023-04-30 NOTE — PROGRESS NOTES
ASSESSMENT/PLAN:      ICD-10-CM    1. Acute streptococcal pharyngitis  J02.0 Streptococcus A Rapid Screen w/Reflex to PCR - Clinic Collect     amoxicillin-clavulanate (AUGMENTIN-ES) 600-42.9 MG/5ML suspension     acetaminophen (TYLENOL) 500 MG tablet     ibuprofen (ADVIL/MOTRIN) 200 MG tablet      2. Uvulitis  K12.2 amoxicillin-clavulanate (AUGMENTIN-ES) 600-42.9 MG/5ML suspension     acetaminophen (TYLENOL) 500 MG tablet     ibuprofen (ADVIL/MOTRIN) 200 MG tablet      3. Influenza B  J10.1 Influenza A & B Antigen - Clinic Collect     oseltamivir (TAMIFLU) 75 MG capsule      4. Acute suppurative otitis media of left ear without spontaneous rupture of tympanic membrane, recurrence not specified  H66.002 amoxicillin-clavulanate (AUGMENTIN-ES) 600-42.9 MG/5ML suspension     acetaminophen (TYLENOL) 500 MG tablet     ibuprofen (ADVIL/MOTRIN) 200 MG tablet      5. Suspected COVID-19 virus infection  Z20.822 Symptomatic COVID-19 Virus (Coronavirus) by PCR Nose                Reviewed medication instructions and side effects. Follow up if experiences side effects.     I reviewed supportive care, otc meds to use if needed, expected course, and signs of concern.  Follow up as needed or if she does not improve within  1-2 days or if worsens in any way.  Reviewed red flag symptoms and is to go to the ER if experiences any of these.     The use of Dragon/[employer] dictation services may have been used to construct the content in this note; any grammatical or spelling errors are non-intentional. Please contact the author of this note directly if you are in need of any clarification.      Due to language barrier, daughter, January Hto, age 18 served as an  and was present during the history-taking and subsequent discussion and the physical exam with this patient. Patient declined clinic  service.     On the day of the encounter, time spend on chart review, patient visit, review of testing, documentation  was 30  minutes          Patient Instructions     Start Augmentin-to treat your strep throat, swollen uvula, and left ear infection      Follow up as needed or if your symptoms worsen in any way.     Follow up with your primary care provider or clinic in about 2-3 days  if your symptoms do not improve                  Patient presents with:  URI: Cough, fever,   Pharyngitis: Possible, close contact with persons with Strep  Headache       Subjective     Shahbaz Nam is a 38 year old female who presents to clinic today for the following health issues:    HPI   Patient with onset 2 days ago of sore throat, headache, ear pain, myalgias, cough, fever/chills   Spouse and daughter with recent strep throat  Taking tylenol and ibuprofen-decrease fever/headache for only a few hours then fever/headache returned     Vaccinate for covid      Past Medical History:   Diagnosis Date     Abnormal CXR (chest x-ray) 12/2017    right chest volume loss     Anemia, chronic disease 1/9/2020     Scoliosis      SVT (supraventricular tachycardia) (H)      Tuberculosis     had pulmonary TB in 2001, received 4 drug regimen.      Social History     Tobacco Use     Smoking status: Never     Passive exposure: Past     Smokeless tobacco: Never   Vaping Use     Vaping status: Former     Passive vaping exposure: Yes   Substance Use Topics     Alcohol use: No       Current Outpatient Medications   Medication Sig Dispense Refill     acetaminophen (TYLENOL) 500 MG tablet Take 2 tablets (1,000 mg) by mouth every 8 hours as needed for mild pain, fever or pain 30 tablet 0     amoxicillin-clavulanate (AUGMENTIN-ES) 600-42.9 MG/5ML suspension Take 8.5 mLs (1,020 mg) by mouth 2 times daily for 10 days 170 mL 0     ibuprofen (ADVIL/MOTRIN) 200 MG tablet Take 2 tablets (400 mg) by mouth every 6 hours as needed for pain or fever 30 tablet 0     meclizine (ANTIVERT) 25 MG tablet Take 1 tablet (25 mg) by mouth 3 times daily as needed for dizziness 15 tablet 0      "oseltamivir (TAMIFLU) 75 MG capsule Take 1 capsule (75 mg) by mouth 2 times daily for 5 days 10 capsule 0     docusate sodium (COLACE) 100 MG capsule Take 1 capsule (100 mg) by mouth 2 times daily (Patient not taking: Reported on 6/30/2022) 180 capsule 3     EPINEPHrine (EPIPEN) 0.3 mg/0.3 mL injection [EPINEPHRINE (EPIPEN) 0.3 MG/0.3 ML INJECTION] Inject 0.3 mL (0.3 mg total) into the shoulder, thigh, or buttocks as needed for anaphylaxis. Inject into thigh. (Patient not taking: Reported on 3/13/2023) 1 0     meclizine (ANTIVERT) 25 mg tablet [MECLIZINE (ANTIVERT) 25 MG TABLET] Take 1 tablet (25 mg total) by mouth 3 (three) times a day as needed for dizziness or nausea. (Patient not taking: Reported on 2/25/2022) 30 tablet 1     Allergies   Allergen Reactions     Bee Venom Protein (Honey Bee) [Bee Venom] Anaphylaxis and Itching     Other Food Allergy Anaphylaxis     Frog and Turtle     Shellfish Allergy Hives     Wasp Venom [Wasp Venom Protein] Anaphylaxis and Itching             ROS are negative, except as otherwise noted HPI      Objective    /70 (BP Location: Right arm, Patient Position: Right side, Cuff Size: Adult Small)   Pulse 103   Temp (!) 100.7  F (38.2  C) (Oral)   Ht 1.473 m (4' 10\")   Wt 35.7 kg (78 lb 11.2 oz)   SpO2 99%   BMI 16.45 kg/m    Body mass index is 16.45 kg/m .  Physical Exam     /70 (BP Location: Right arm, Patient Position: Right side, Cuff Size: Adult Small)   Pulse 103   Temp (!) 100.7  F (38.2  C) (Oral)   Ht 1.473 m (4' 10\")   Wt 35.7 kg (78 lb 11.2 oz)   SpO2 99%   BMI 16.45 kg/m     GENERAL: Alert and oriented x 3. Mild distress.   HEENT: Eyes clear.  Nose with thick nasal congesiton. Oropharynx posteior pharynx moderately injected, uvula enlarged.  Effected ear(s) show(s) opacity and erythema of the TM.  NECK: supple and shoddy anterior and posterior chains bilateral adenopathy or masses,   CHEST:  clear, no wheezing or rales.  CV: Regular rate and rhythm " without murmurs gallops or rubs  NEURO:Alert and oriented x3, normal strength and tone, normal gait      Diagnostic Test Results:  Labs reviewed in Epic  Results for orders placed or performed in visit on 04/30/23   Symptomatic COVID-19 Virus (Coronavirus) by PCR Nose     Status: Normal    Specimen: Nose; Swab   Result Value Ref Range    SARS CoV2 PCR Negative Negative    Narrative    Testing was performed using the Aptima SARS-CoV-2 Assay on the  [employer] Instrument System. Additional information about this  Emergency Use Authorization (EUA) assay can be found via the Lab  Guide. This test should be ordered for the detection of SARS-CoV-2 in  individuals who meet SARS-CoV-2 clinical and/or epidemiological  criteria. Test performance is unknown in asymptomatic patients. This  test is for in vitro diagnostic use under the FDA EUA for  laboratories certified under CLIA to perform high complexity testing.  This test has not been FDA cleared or approved. A negative result  does not rule out the presence of PCR inhibitors in the specimen or  target RNA in concentration below the limit of detection for the  assay. The possibility of a false negative should be considered if  the patient's recent exposure or clinical presentation suggests  COVID-19. This test was validated by the [facility]. This laboratory is certified under  the Clinical Laboratory Improvement Amendments of 1988 (CLIA-88) as  qualified to perform high complexity laboratory testing.   Streptococcus A Rapid Screen w/Reflex to PCR - Clinic Collect     Status: Abnormal    Specimen: Throat; Swab   Result Value Ref Range    Group A Strep antigen Positive (A) Negative   Influenza A & B Antigen - Clinic Collect     Status: Abnormal    Specimen: Nose; Swab   Result Value Ref Range    Influenza A antigen Negative Negative    Influenza B antigen Positive (A) Negative    Narrative    Test results must be correlated  with clinical data. If necessary, results should be confirmed by a molecular assay or viral culture.

## 2023-05-01 ENCOUNTER — TELEPHONE (OUTPATIENT)
Dept: FAMILY MEDICINE | Facility: CLINIC | Age: 38
End: 2023-05-01
Payer: COMMERCIAL

## 2023-05-01 LAB — SARS-COV-2 RNA RESP QL NAA+PROBE: NEGATIVE

## 2023-05-01 RX ORDER — OSELTAMIVIR PHOSPHATE 75 MG/1
75 CAPSULE ORAL 2 TIMES DAILY
Qty: 10 CAPSULE | Refills: 0 | Status: SHIPPED | OUTPATIENT
Start: 2023-05-01 | End: 2023-05-06

## 2023-05-01 NOTE — TELEPHONE ENCOUNTER
Called, spoke with patient using . Relayed provider's message. Patient understood and will  rx Tamiflu today. Medical Assistant clarified with patient to make sure both abx rx for flu and strep are to be completed. No further questions.    Broderick Holliday MA on 5/1/2023 at 1:20 PM

## 2023-05-01 NOTE — TELEPHONE ENCOUNTER
----- Message from Gayla Castaneda MD sent at 5/1/2023  9:33 AM CDT -----  Please call patient with [facility]  to inform her that another one of her tests was positive for influenza.  She needs to start an additional medication-tamiflu-2 tablets 2 times a day  for 5 days -and continue to take all of the medications prescribed at the time she was seen in urgent care. The prescription was sent to [facility].     I called patient on my [facility] [employer] [facility] margarette from my cell phone-no one was at home that could translate for me.     Thank you,  Gayla Castaneda MD

## 2023-05-20 ENCOUNTER — NURSE TRIAGE (OUTPATIENT)
Dept: NURSING | Facility: CLINIC | Age: 38
End: 2023-05-20

## 2023-05-20 ENCOUNTER — OFFICE VISIT (OUTPATIENT)
Dept: FAMILY MEDICINE | Facility: CLINIC | Age: 38
End: 2023-05-20
Payer: COMMERCIAL

## 2023-05-20 VITALS
WEIGHT: 76 LBS | BODY MASS INDEX: 15.88 KG/M2 | RESPIRATION RATE: 18 BRPM | TEMPERATURE: 100.8 F | OXYGEN SATURATION: 97 % | DIASTOLIC BLOOD PRESSURE: 62 MMHG | HEART RATE: 105 BPM | SYSTOLIC BLOOD PRESSURE: 89 MMHG

## 2023-05-20 DIAGNOSIS — N39.0 ACUTE UTI (URINARY TRACT INFECTION): ICD-10-CM

## 2023-05-20 DIAGNOSIS — R30.0 DYSURIA: ICD-10-CM

## 2023-05-20 DIAGNOSIS — J02.9 SORE THROAT: Primary | ICD-10-CM

## 2023-05-20 LAB
ALBUMIN UR-MCNC: ABNORMAL MG/DL
APPEARANCE UR: CLEAR
BACTERIA #/AREA URNS HPF: ABNORMAL /HPF
BASOPHILS # BLD AUTO: 0 10E3/UL (ref 0–0.2)
BASOPHILS NFR BLD AUTO: 0 %
BILIRUB UR QL STRIP: NEGATIVE
COLOR UR AUTO: YELLOW
DEPRECATED S PYO AG THROAT QL EIA: NEGATIVE
EOSINOPHIL # BLD AUTO: 0 10E3/UL (ref 0–0.7)
EOSINOPHIL NFR BLD AUTO: 0 %
ERYTHROCYTE [DISTWIDTH] IN BLOOD BY AUTOMATED COUNT: 13 % (ref 10–15)
GLUCOSE UR STRIP-MCNC: NEGATIVE MG/DL
GROUP A STREP BY PCR: NOT DETECTED
HCT VFR BLD AUTO: 34.1 % (ref 35–47)
HGB BLD-MCNC: 10.9 G/DL (ref 11.7–15.7)
HGB UR QL STRIP: ABNORMAL
IMM GRANULOCYTES # BLD: 0 10E3/UL
IMM GRANULOCYTES NFR BLD: 0 %
KETONES UR STRIP-MCNC: 80 MG/DL
LEUKOCYTE ESTERASE UR QL STRIP: ABNORMAL
LYMPHOCYTES # BLD AUTO: 1 10E3/UL (ref 0.8–5.3)
LYMPHOCYTES NFR BLD AUTO: 10 %
MCH RBC QN AUTO: 28.1 PG (ref 26.5–33)
MCHC RBC AUTO-ENTMCNC: 32 G/DL (ref 31.5–36.5)
MCV RBC AUTO: 88 FL (ref 78–100)
MONOCYTES # BLD AUTO: 0.6 10E3/UL (ref 0–1.3)
MONOCYTES NFR BLD AUTO: 6 %
NEUTROPHILS # BLD AUTO: 8.2 10E3/UL (ref 1.6–8.3)
NEUTROPHILS NFR BLD AUTO: 83 %
NITRATE UR QL: NEGATIVE
PH UR STRIP: 6 [PH] (ref 5–8)
PLATELET # BLD AUTO: 194 10E3/UL (ref 150–450)
PROCALCITONIN SERPL IA-MCNC: 0.08 NG/ML
RBC # BLD AUTO: 3.88 10E6/UL (ref 3.8–5.2)
RBC #/AREA URNS AUTO: ABNORMAL /HPF
SP GR UR STRIP: 1.01 (ref 1–1.03)
SQUAMOUS #/AREA URNS AUTO: ABNORMAL /LPF
UROBILINOGEN UR STRIP-ACNC: 0.2 E.U./DL
WBC # BLD AUTO: 9.9 10E3/UL (ref 4–11)
WBC #/AREA URNS AUTO: ABNORMAL /HPF

## 2023-05-20 PROCEDURE — 87651 STREP A DNA AMP PROBE: CPT | Performed by: STUDENT IN AN ORGANIZED HEALTH CARE EDUCATION/TRAINING PROGRAM

## 2023-05-20 PROCEDURE — 85025 COMPLETE CBC W/AUTO DIFF WBC: CPT | Performed by: STUDENT IN AN ORGANIZED HEALTH CARE EDUCATION/TRAINING PROGRAM

## 2023-05-20 PROCEDURE — 81001 URINALYSIS AUTO W/SCOPE: CPT | Performed by: STUDENT IN AN ORGANIZED HEALTH CARE EDUCATION/TRAINING PROGRAM

## 2023-05-20 PROCEDURE — 36415 COLL VENOUS BLD VENIPUNCTURE: CPT | Performed by: STUDENT IN AN ORGANIZED HEALTH CARE EDUCATION/TRAINING PROGRAM

## 2023-05-20 PROCEDURE — 99214 OFFICE O/P EST MOD 30 MIN: CPT | Performed by: STUDENT IN AN ORGANIZED HEALTH CARE EDUCATION/TRAINING PROGRAM

## 2023-05-20 PROCEDURE — 84145 PROCALCITONIN (PCT): CPT | Performed by: STUDENT IN AN ORGANIZED HEALTH CARE EDUCATION/TRAINING PROGRAM

## 2023-05-20 RX ORDER — SULFAMETHOXAZOLE/TRIMETHOPRIM 800-160 MG
1 TABLET ORAL 2 TIMES DAILY
Qty: 6 TABLET | Refills: 0 | Status: SHIPPED | OUTPATIENT
Start: 2023-05-20 | End: 2023-05-20

## 2023-05-20 RX ORDER — SULFAMETHOXAZOLE/TRIMETHOPRIM 800-160 MG
1 TABLET ORAL 2 TIMES DAILY
Qty: 6 TABLET | Refills: 0 | Status: SHIPPED | OUTPATIENT
Start: 2023-05-20

## 2023-05-21 NOTE — TELEPHONE ENCOUNTER
Pt's daughter January requesting prescription for Sulfamethoxazole-Trimethoprim be sent to alternate pharmacy as pharmacy prescription originally sent to is now closed.     Writer spoke with pt with [facility] #[id number] as [facility]  was not available and pt ok with [facility] . Pt confirmed request for alternate pharmacy.    Prescription re sent to pt's preferred pharmacy.     Reason for Disposition    [1] Prescription prescribed recently is not at pharmacy AND [2] triager has access to patient's EMR AND [3] prescription is recorded in the EMR    Protocols used: MEDICATION QUESTION CALL-A-AH

## 2023-05-21 NOTE — PROGRESS NOTES
Assessment & Plan     Sore throat  Was just seen at [facility] hospital and discharged with conservative management. I rechecked some labs which did not show leukocytosis but will treat for urinary tract infection based on UA. Recommended 3 days DS bactrim and then close follow up for symptom resolution. Encouraged her to increase her fluid intake as well. The patient is otherwise well-appearing, and I have a low probability for any other serious bacterial infection, or sepsis at this time.     - Streptococcus A Rapid Screen w/Reflex to PCR - Clinic Collect  - Group A Streptococcus PCR Throat Swab  - Procalcitonin; Future  - Procalcitonin    Dysuria  - UA with Microscopic reflex to Culture  - CBC with platelets and differential; Future  - CBC with platelets and differential  - UA Microscopic with Reflex to Culture    Acute UTI (urinary tract infection)  - sulfamethoxazole-trimethoprim (BACTRIM DS) 800-160 MG tablet; Take 1 tablet by mouth 2 times daily    Return if symptoms worsen or fail to improve.    MD JOSE LUIS [facility]    Elieser Hartmann is a 38 year old, presenting for the following health issues:  Urinary Problem (Burning with urination, abdominal pain x2 days), Back Pain (Bilateral lower back pain x4 days), and Cold Symptoms (Cough, sore throat, shortness of breath, fever x1 week)        11/14/2022     5:24 PM   Additional Questions   Roomed by Gordy AMAYA   Accompanied by spouse     HPI     Burning with urination 2 days, back pain for 4 days also.  Eating and drinking going well.   Also endorses cough, sore throat, fever for 1 week.  Was seen at [facility] ED earlier today chest x-ray normal.   CBC no leukocytosis, procalcitonin nl, RSV, COVID and Flu negative.  Recommended to take tylenol but is not feeling better.        Review of Systems   Constitutional, HEENT, cardiovascular, pulmonary, gi and gu systems are negative, except as otherwise noted.      Objective    BP (!) 89/62  (BP Location: Right arm, Patient Position: Sitting, Cuff Size: Adult Small)   Pulse 105   Temp (!) 100.8  F (38.2  C) (Tympanic)   Resp 18   Wt 34.5 kg (76 lb)   SpO2 97%   BMI 15.88 kg/m    Body mass index is 15.88 kg/m .  Physical Exam   GENERAL: healthy, alert and no distress  EYES: Eyes grossly normal to inspection, PERRL and conjunctivae and sclerae normal  HENT: ear canals and TM's normal, nose and mouth without ulcers or lesions  NECK: no adenopathy, no asymmetry, masses, or scars and thyroid normal to palpation  RESP: lungs clear to auscultation - no rales, rhonchi or wheezes  CV: regular rate and rhythm, normal S1 S2, no S3 or S4, no murmur, click or rub, no peripheral edema and peripheral pulses strong  ABDOMEN: soft, nontender, no hepatosplenomegaly, no masses and bowel sounds normal  MS: no gross musculoskeletal defects noted, no edema  SKIN: no suspicious lesions or rashes  NEURO: Normal strength and tone, mentation intact and speech normal  PSYCH: mentation appears normal, affect normal/bright    Results for orders placed or performed in visit on 05/20/23   UA with Microscopic reflex to Culture     Status: Abnormal    Specimen: Urine, Clean Catch   Result Value Ref Range    Color Urine Yellow Colorless, Straw, Light Yellow, Yellow    Appearance Urine Clear Clear    Glucose Urine Negative Negative mg/dL    Bilirubin Urine Negative Negative    Ketones Urine 80 (A) Negative mg/dL    Specific Gravity Urine 1.015 1.005 - 1.030    Blood Urine Small (A) Negative    pH Urine 6.0 5.0 - 8.0    Protein Albumin Urine Trace (A) Negative mg/dL    Urobilinogen Urine 0.2 0.2, 1.0 E.U./dL    Nitrite Urine Negative Negative    Leukocyte Esterase Urine Trace (A) Negative   CBC with platelets and differential     Status: Abnormal   Result Value Ref Range    WBC Count 9.9 4.0 - 11.0 10e3/uL    RBC Count 3.88 3.80 - 5.20 10e6/uL    Hemoglobin 10.9 (L) 11.7 - 15.7 g/dL    Hematocrit 34.1 (L) 35.0 - 47.0 %    MCV 88 78  - 100 fL    MCH 28.1 26.5 - 33.0 pg    MCHC 32.0 31.5 - 36.5 g/dL    RDW 13.0 10.0 - 15.0 %    Platelet Count 194 150 - 450 10e3/uL    % Neutrophils 83 %    % Lymphocytes 10 %    % Monocytes 6 %    % Eosinophils 0 %    % Basophils 0 %    % Immature Granulocytes 0 %    Absolute Neutrophils 8.2 1.6 - 8.3 10e3/uL    Absolute Lymphocytes 1.0 0.8 - 5.3 10e3/uL    Absolute Monocytes 0.6 0.0 - 1.3 10e3/uL    Absolute Eosinophils 0.0 0.0 - 0.7 10e3/uL    Absolute Basophils 0.0 0.0 - 0.2 10e3/uL    Absolute Immature Granulocytes 0.0 <=0.4 10e3/uL   UA Microscopic with Reflex to Culture     Status: Abnormal   Result Value Ref Range    Bacteria Urine Few (A) None Seen /HPF    RBC Urine 0-2 0-2 /HPF /HPF    WBC Urine 0-5 0-5 /HPF /HPF    Squamous Epithelials Urine Few (A) None Seen /LPF    Narrative    Urine Culture not indicated   Streptococcus A Rapid Screen w/Reflex to PCR - Clinic Collect     Status: Normal    Specimen: Throat; Swab   Result Value Ref Range    Group A Strep antigen Negative Negative   CBC with platelets and differential     Status: Abnormal    Narrative    The following orders were created for panel order CBC with platelets and differential.  Procedure                               Abnormality         Status                     ---------                               -----------         ------                     CBC with platelets and d...[[id number]]  Abnormal            Final result                 Please view results for these tests on the individual orders.       ----- Service Performed and Documented by Resident or Fellow ------

## 2023-05-22 ENCOUNTER — OFFICE VISIT (OUTPATIENT)
Dept: FAMILY MEDICINE | Facility: CLINIC | Age: 38
End: 2023-05-22
Payer: COMMERCIAL

## 2023-05-22 VITALS
RESPIRATION RATE: 16 BRPM | TEMPERATURE: 98.2 F | HEART RATE: 72 BPM | OXYGEN SATURATION: 100 % | SYSTOLIC BLOOD PRESSURE: 107 MMHG | WEIGHT: 76.6 LBS | BODY MASS INDEX: 16.01 KG/M2 | DIASTOLIC BLOOD PRESSURE: 76 MMHG

## 2023-05-22 DIAGNOSIS — R30.0 DYSURIA: Primary | ICD-10-CM

## 2023-05-22 LAB
ALBUMIN UR-MCNC: NEGATIVE MG/DL
APPEARANCE UR: CLEAR
BACTERIA #/AREA URNS HPF: ABNORMAL /HPF
BILIRUB UR QL STRIP: NEGATIVE
COLOR UR AUTO: YELLOW
GLUCOSE UR STRIP-MCNC: NEGATIVE MG/DL
HGB UR QL STRIP: ABNORMAL
KETONES UR STRIP-MCNC: NEGATIVE MG/DL
LEUKOCYTE ESTERASE UR QL STRIP: NEGATIVE
NITRATE UR QL: NEGATIVE
PH UR STRIP: 6.5 [PH] (ref 5–8)
RBC #/AREA URNS AUTO: ABNORMAL /HPF
SP GR UR STRIP: 1.01 (ref 1–1.03)
SQUAMOUS #/AREA URNS AUTO: ABNORMAL /LPF
UROBILINOGEN UR STRIP-ACNC: 0.2 E.U./DL
WBC #/AREA URNS AUTO: ABNORMAL /HPF

## 2023-05-22 PROCEDURE — 81001 URINALYSIS AUTO W/SCOPE: CPT | Performed by: STUDENT IN AN ORGANIZED HEALTH CARE EDUCATION/TRAINING PROGRAM

## 2023-05-22 PROCEDURE — 99213 OFFICE O/P EST LOW 20 MIN: CPT | Mod: GC | Performed by: STUDENT IN AN ORGANIZED HEALTH CARE EDUCATION/TRAINING PROGRAM

## 2023-05-22 NOTE — PATIENT INSTRUCTIONS
Your vital signs have all improved fever is gone, blood pressure is normal and heart rate also looks normal now.  We will recheck a urine sample but it appears your symptoms have all gone away.

## 2023-05-22 NOTE — PROGRESS NOTES
Preceptor Attestation:    I discussed the patient with the resident and evaluated the patient in person. I have verified the content of the note, which accurately reflects my assessment of the patient and the plan of care.   Supervising Physician:  Darrel Jon MD.

## 2023-05-22 NOTE — PROGRESS NOTES
Assessment & Plan     Dysuria  Vital signs have normalized since her last visit on Saturday, her hypertension has resolved the tachycardia has resolved fever has improved.    Subjectively she feels well denies any further urinary symptoms at this time.  Still has a lingering cough but is manageable.  Discussed with her that the improvement of her vital signs and subjective symptoms is reassuring.  - UA reflex to Microscopic; Future  - UA reflex to Microscopic    Return if symptoms worsen or fail to improve.    MD JOSE LUIS Peña [facility]    Elieser Hartmann is a 38 year old, presenting for the following health issues:  Follow Up (Follow up UTI )        11/14/2022     5:24 PM   Additional Questions   Roomed by Gordy AMAYA   Accompanied by spouse     HPI     5/20/23 Seen at [facility] ED, normal CBC, Procalcitonin. CXR normal, covid/flu/rsv negative. Then seen at urgent care treated for UTI with DS Bactrim.     Today is feeling much better, cough is not improved. But burning and itching with urination is better.   Eating and drinking is better. Feels much better overall.   Fever has also come down.   Recheck today to check that her symptoms resolved and her vitals improved.        Review of Systems   Constitutional, HEENT, cardiovascular, pulmonary, gi and gu systems are negative, except as otherwise noted.      Objective    /76 (BP Location: Left arm, Patient Position: Sitting, Cuff Size: Adult Regular)   Pulse 72   Temp 98.2  F (36.8  C) (Oral)   Resp 16   Wt 34.7 kg (76 lb 9.6 oz)   SpO2 100%   BMI 16.01 kg/m    Body mass index is 16.01 kg/m .  Physical Exam   GENERAL: healthy, alert and no distress  EYES: Eyes grossly normal to inspection, PERRL and conjunctivae and sclerae normal  HENT: ear canals and TM's normal, nose and mouth without ulcers or lesions  NECK: no adenopathy, no asymmetry, masses, or scars and thyroid normal to palpation  RESP: lungs clear to auscultation - no  rales, rhonchi or wheezes  CV: regular rate and rhythm, normal S1 S2, no S3 or S4, no murmur, click or rub, no peripheral edema and peripheral pulses strong  ABDOMEN: soft, nontender, no hepatosplenomegaly, no masses and bowel sounds normal  MS: no gross musculoskeletal defects noted, no edema  SKIN: no suspicious lesions or rashes  NEURO: Normal strength and tone, mentation intact and speech normal  PSYCH: mentation appears normal, affect normal/bright    No results found for any visits on 05/22/23.    ----- Service Performed and Documented by Resident or Fellow ------

## 2024-03-18 ENCOUNTER — OFFICE VISIT (OUTPATIENT)
Dept: FAMILY MEDICINE | Facility: CLINIC | Age: 39
End: 2024-03-18
Payer: COMMERCIAL

## 2024-03-18 VITALS
TEMPERATURE: 98.1 F | HEART RATE: 68 BPM | OXYGEN SATURATION: 99 % | SYSTOLIC BLOOD PRESSURE: 106 MMHG | DIASTOLIC BLOOD PRESSURE: 70 MMHG | RESPIRATION RATE: 18 BRPM

## 2024-03-18 DIAGNOSIS — J30.2 SEASONAL ALLERGIC RHINITIS, UNSPECIFIED TRIGGER: Primary | ICD-10-CM

## 2024-03-18 DIAGNOSIS — Z23 NEED FOR INFLUENZA VACCINATION: ICD-10-CM

## 2024-03-18 DIAGNOSIS — J02.9 SORE THROAT: ICD-10-CM

## 2024-03-18 LAB
DEPRECATED S PYO AG THROAT QL EIA: NEGATIVE
GROUP A STREP BY PCR: NOT DETECTED

## 2024-03-18 PROCEDURE — 90686 IIV4 VACC NO PRSV 0.5 ML IM: CPT | Performed by: PHYSICIAN ASSISTANT

## 2024-03-18 PROCEDURE — 99213 OFFICE O/P EST LOW 20 MIN: CPT | Mod: 25 | Performed by: PHYSICIAN ASSISTANT

## 2024-03-18 PROCEDURE — 87651 STREP A DNA AMP PROBE: CPT | Performed by: PHYSICIAN ASSISTANT

## 2024-03-18 PROCEDURE — 90471 IMMUNIZATION ADMIN: CPT | Performed by: PHYSICIAN ASSISTANT

## 2024-03-18 RX ORDER — CETIRIZINE HYDROCHLORIDE 10 MG/1
10 TABLET ORAL DAILY PRN
Qty: 30 TABLET | Refills: 0 | Status: SHIPPED | OUTPATIENT
Start: 2024-03-18

## 2024-03-18 NOTE — LETTER
March 18, 2024      Shahbaz Nam  [address] [place] [address]        To Whom It May Concern:    Shahbaz Nam  was seen on  in the clinic today. Please excuse them from work/school until symptoms improve.        Sincerely,        Mohit Molina PA-C

## 2024-03-18 NOTE — PROGRESS NOTES
Chief Complaint   Patient presents with    Otalgia    eyes itchy    Throat Pain     X1 week    ears itchy       ASSESSMENT/PLAN:  Shahbaz was seen today for otalgia, eyes itchy, throat pain and ears itchy.    Diagnoses and all orders for this visit:    Seasonal allergic rhinitis, unspecified trigger  -     cetirizine (ZYRTEC) 10 MG tablet; Take 1 tablet (10 mg) by mouth daily as needed for allergies (allergies)    Need for influenza vaccination  -     Cancel: INFLUENZA VACCINE 18-64Y (FLUBLOK)    Sore throat  -     Streptococcus A Rapid Screen w/Reflex to PCR - Clinic Collect  -     Group A Streptococcus PCR Throat Swab    Other orders  -     INFLUENZA VACCINE >6 MONTHS (AFLURIA/FLUZONE)    Strep negative.  Suspect allergies.  Trial of Zyrtec.  Follow-up if not improving over the next 1 to 2 weeks    Mohit Molina PA-C      SUBJECTIVE:  Shahbaz is a 39 year old female who presents to urgent care with 1 week of sore throat and itchy throat, itchy eyes and itchy ears.  Sometimes will have cough.  No fever or chill.  Intermittent fatigue    ROS: Pertinent ROS neg other than the symptoms noted above in the HPI.     OBJECTIVE:  /70 (BP Location: Right arm, Patient Position: Sitting, Cuff Size: Adult Small)   Pulse 68   Temp 98.1  F (36.7  C) (Tympanic)   Resp 18   SpO2 99%    GENERAL: alert and no distress  EYES: Eyes grossly normal to inspection, PERRL and conjunctivae and sclerae normal  HENT: ear canals and TM's normal, nose and mouth without ulcers or lesions, no oropharynx erythema  NECK: no adenopathy, no asymmetry, masses, or scars  RESP: lungs clear to auscultation - no rales, rhonchi or wheezes  CV: regular rate and rhythm, normal S1 S2, no S3 or S4, no murmur, click or rub, no peripheral edema     DIAGNOSTICS    Results for orders placed or performed in visit on 03/18/24   Streptococcus A Rapid Screen w/Reflex to PCR - Clinic Collect     Status: Normal    Specimen: Throat; Swab   Result Value Ref Range     Group A Strep antigen Negative Negative        Current Outpatient Medications   Medication    acetaminophen (TYLENOL) 500 MG tablet    ibuprofen (ADVIL/MOTRIN) 200 MG tablet    docusate sodium (COLACE) 100 MG capsule    EPINEPHrine (EPIPEN) 0.3 mg/0.3 mL injection    meclizine (ANTIVERT) 25 MG tablet    meclizine (ANTIVERT) 25 mg tablet    sulfamethoxazole-trimethoprim (BACTRIM DS) 800-160 MG tablet     No current facility-administered medications for this visit.      Patient Active Problem List   Diagnosis    Vitamin D Deficiency    Scoliosis    Amenorrhea    Restrictive lung disease    Elevated WBC count    Seasonal allergies    SVT (supraventricular tachycardia)    Anemia, chronic disease      Past Medical History:   Diagnosis Date    Abnormal CXR (chest x-ray) 12/2017    right chest volume loss    Anemia, chronic disease 1/9/2020    Scoliosis     SVT (supraventricular tachycardia) (H)     Tuberculosis     had pulmonary TB in 2001, received 4 drug regimen.      Past Surgical History:   Procedure Laterality Date    OTHER SURGICAL HISTORY  04/2016    ablation for svt    OTHER SURGICAL HISTORY      AVNRT ablation    MA LAP,APPENDECTOMY N/A 7/5/2019    Procedure: APPENDECTOMY, LAPAROSCOPIC;  Surgeon: Payam Tran MD;  Location: [facility];  Service: General     Family History   Problem Relation Age of Onset    No Known Problems Mother     Tuberculosis Father     No Known Problems Child     No Known Problems Child     No Known Problems Child      Social History     Tobacco Use    Smoking status: Never     Passive exposure: Past    Smokeless tobacco: Never   Substance Use Topics    Alcohol use: No              The plan of care was discussed with the patient. They understand and agree with the course of treatment prescribed. A printed summary was given including instructions and medications.  The use of Dragon/Qianmi dictation services may have been used to construct the content in this note; any  grammatical or spelling errors are non-intentional. Please contact the author of this note directly if you are in need of any clarification.

## 2024-04-05 ENCOUNTER — OFFICE VISIT (OUTPATIENT)
Dept: FAMILY MEDICINE | Facility: CLINIC | Age: 39
End: 2024-04-05
Payer: COMMERCIAL

## 2024-04-05 VITALS
TEMPERATURE: 98.4 F | OXYGEN SATURATION: 97 % | DIASTOLIC BLOOD PRESSURE: 64 MMHG | HEART RATE: 87 BPM | RESPIRATION RATE: 14 BRPM | SYSTOLIC BLOOD PRESSURE: 95 MMHG

## 2024-04-05 DIAGNOSIS — R05.9 COUGH IN ADULT: Primary | ICD-10-CM

## 2024-04-05 PROCEDURE — 99213 OFFICE O/P EST LOW 20 MIN: CPT | Performed by: PHYSICIAN ASSISTANT

## 2024-04-05 RX ORDER — FLUTICASONE PROPIONATE 50 MCG
2 SPRAY, SUSPENSION (ML) NASAL DAILY
Qty: 16 G | Refills: 2 | Status: SHIPPED | OUTPATIENT
Start: 2024-04-05

## 2024-04-05 RX ORDER — BENZONATATE 200 MG/1
200 CAPSULE ORAL 3 TIMES DAILY PRN
Qty: 30 CAPSULE | Refills: 0 | Status: SHIPPED | OUTPATIENT
Start: 2024-04-05

## 2024-04-05 NOTE — LETTER
April 5, 2024      Shahbaz Nam  [address] [place] [address]        To Whom It May Concern:    Shahbaz Nam  was seen on 4/5/24.  Please excuse her  from 4/4/24 until returning for work on  4/8/24.        Sincerely,        Jonny Peace PA-C    
Abdomen soft, nontender, nondistended, bowel sounds present in all 4 quadrants.

## 2024-04-05 NOTE — PROGRESS NOTES
Assessment & Plan     1. Cough in adult    - fluticasone (FLONASE) 50 MCG/ACT nasal spray; Spray 2 sprays into both nostrils daily  Dispense: 16 g; Refill: 2  - benzonatate (TESSALON) 200 MG capsule; Take 1 capsule (200 mg) by mouth 3 times daily as needed for cough  Dispense: 30 capsule; Refill: 0     Follow up with PCP in the next week.                   AURELIA Juares [facility]    Elieser     Ta is a 39 year old female who presents to clinic today for the following health issues:  Chief Complaint   Patient presents with    Fever     Has fever headache fatigue  cough  eyes red itching for 2 weeks     HPI    Here for cough for 2 weeks. Getting worse she says due to runny nose now for 1 week. She is taking cetirizine she says daily since last visit in clinic 3 weeks ago. Fever 2 days ago to 100. None since. Some sore throat that is more itchy that comes and goes. Some post nasal drainage. She is not taking any steroid nasal spray.             Review of Systems        Objective    BP 95/64   Pulse 87   Temp 98.4  F (36.9  C) (Oral)   Resp 14   SpO2 97%   Physical Exam  Vitals and nursing note reviewed.   Constitutional:       General: She is not in acute distress.     Appearance: She is well-developed. She is not diaphoretic.   HENT:      Head: Normocephalic and atraumatic.      Right Ear: Tympanic membrane and external ear normal.      Left Ear: Tympanic membrane and external ear normal.      Nose:      Right Turbinates: Swollen.      Left Turbinates: Swollen.      Mouth/Throat:      Mouth: Mucous membranes are moist.      Pharynx: No posterior oropharyngeal erythema.   Eyes:      Pupils: Pupils are equal, round, and reactive to light.   Cardiovascular:      Rate and Rhythm: Normal rate and regular rhythm.   Pulmonary:      Effort: Pulmonary effort is normal. No respiratory distress.      Breath sounds: Normal breath sounds.   Musculoskeletal:      Cervical back:  Normal range of motion and neck supple.   Lymphadenopathy:      Cervical: No cervical adenopathy.   Neurological:      Mental Status: She is alert.

## 2024-06-14 ENCOUNTER — OFFICE VISIT (OUTPATIENT)
Dept: FAMILY MEDICINE | Facility: CLINIC | Age: 39
End: 2024-06-14
Payer: COMMERCIAL

## 2024-06-14 VITALS
RESPIRATION RATE: 20 BRPM | OXYGEN SATURATION: 97 % | HEART RATE: 72 BPM | SYSTOLIC BLOOD PRESSURE: 101 MMHG | DIASTOLIC BLOOD PRESSURE: 70 MMHG | BODY MASS INDEX: 16.16 KG/M2 | TEMPERATURE: 98.2 F | WEIGHT: 77.3 LBS

## 2024-06-14 DIAGNOSIS — J06.9 VIRAL URI WITH COUGH: Primary | ICD-10-CM

## 2024-06-14 LAB
DEPRECATED S PYO AG THROAT QL EIA: NEGATIVE
FLUAV AG SPEC QL IA: NEGATIVE
FLUBV AG SPEC QL IA: NEGATIVE
GROUP A STREP BY PCR: NOT DETECTED
SARS-COV-2 RNA RESP QL NAA+PROBE: NEGATIVE

## 2024-06-14 PROCEDURE — 87651 STREP A DNA AMP PROBE: CPT

## 2024-06-14 PROCEDURE — 99214 OFFICE O/P EST MOD 30 MIN: CPT

## 2024-06-14 PROCEDURE — 87635 SARS-COV-2 COVID-19 AMP PRB: CPT

## 2024-06-14 PROCEDURE — 87804 INFLUENZA ASSAY W/OPTIC: CPT

## 2024-06-14 RX ORDER — ALBUTEROL SULFATE 90 UG/1
2 AEROSOL, METERED RESPIRATORY (INHALATION) EVERY 6 HOURS PRN
Qty: 18 G | Refills: 0 | Status: SHIPPED | OUTPATIENT
Start: 2024-06-14

## 2024-06-14 NOTE — PROGRESS NOTES
URGENT CARE  Assessment & Plan   Assessment:   Shahbaz Nam is a 39 year old female who's clinical presentation today is consistent with:   1. Viral URI with cough  - Streptococcus A Rapid Screen w/Reflex to PCR - Clinic Collect  - Influenza A & B Antigen - Clinic Collect  - COVID-19 Virus (Coronavirus) by PCR Nose  - albuterol (PROAIR HFA/PROVENTIL HFA/VENTOLIN HFA)   Plan:  Will treat patient with supportive and symptomatic measures for viral upper respiratory infection with a cough at this time which include: Fluids, rest, over-the-counter medications; recommend she also try an inhaler; side effects of medications reviewed  Patient is well appearing, afebrile, had reassuring vital signs and a benign physical exam. Given lung sounds are clear, I have no concerns or suspicion for bacterial lung} infectious etiology at this time, antibiotics are not indicated, and will encourage patient to continue to closely monitor symptoms  Additionally we discussed if symptoms do not improve after starting today's treatment to follow up in 7-10 days, sooner if symptoms worsen, return precautions given    No alarm signs or symptoms present   Differential Diagnoses for this patient's chief complaint that I considered include:  Bacterial vs viral etiology of URI, Covid, influenza,} pharyngitis/tonsillitis, pneumonia, bronchitis, Common cold, allergic rhinitis, seasonal allergies, ABRS, viral sinusitis, cough, pertussis, Mononucleosis, tonsillitis, chronic sinusitis, meningococcal disease     Patient is} agreeable to treatment plan and state they will follow-up if symptoms do not improve and/or if symptoms worsen   see patient's AVS 'monitor for' section for specific patient instructions given and discussed regarding what to watch for and when to follow up    ERIN Gan [facility]      ______________________________________________________________________      Subjective     HPI: Shahbaz Nam  is a 39  year old  female who presents today for evaluation the following concerns:   Patient accompanied by daughter who was , patient speaks Sharon, they  endorse patient has cold symptoms today which started 2 days ago   Patient reports cough, sore throat, ear pain. Patient also states she feels tired at times.    Patient denies any fevers} sweats, chills, myalgias, wheezing, shortness of breath, difficulty breathing, chest pain, weakness or signs of dehydration   additionally patient denies a history of asthma or any other past medical history of breathing conditions    Review of Systems:  Pertinent review of systems as reflected in HPI, otherwise negative.     Objective    Physical Exam:  Vitals:    06/14/24 1101   BP: 101/70   Pulse: 72   Resp: 20   Temp: 98.2  F (36.8  C)   SpO2: 97%   Weight: 35.1 kg (77 lb 4.8 oz)     General: Alert and oriented, no acute distress, Vital signs reviewed: afebrile,  normotensive   Psy/mental status: Cooperative, nonanxious  SKIN: Intact, no rashes  EYES: EOMs intact, PERRLA bilaterally   Conjunctiva: Clear bilaterally, no injection or erythema present  EARS: TMs intact, translucent gray in color with normal landmarks present no erythema  or bulging tympanic membrane   Canals are without swelling, however have a mild amount of cerumen, no impaction  NOSE:  mucosa moist               No frontal or maxillary sinus tenderness present bilaterally  MOUTH/THROAT: lips, tongue, & oral mucosa appear normal upon inspection                Posterior oropharynx is erythematous but without exudate, lesions or tonsillar  Edema, no dysphonia, no unilateral tonsillar edema, no uvular deviation,   no signs of peritonsillar abscess  NECK: supple, has full range of motion with no meningeal signs              No lymphadenopathy present  LUNG: normal work of breathing, good respiratory effort without retractions, good air  movement, non labored, inspection reveals normal chest expansion  w/  inspiration            Lung sounds are clear to auscultation bilaterally,            No rales/rhonic/crackles wheezing noted           No cough noted      LABS:   Results for orders placed or performed in visit on 06/14/24   Streptococcus A Rapid Screen w/Reflex to PCR - Clinic Collect     Status: Normal    Specimen: Throat; Swab   Result Value Ref Range    Group A Strep antigen Negative Negative   Influenza A & B Antigen - Clinic Collect     Status: Normal    Specimen: Nose; Swab   Result Value Ref Range    Influenza A antigen Negative Negative    Influenza B antigen Negative Negative    Narrative    Test results must be correlated with clinical data. If necessary, results should be confirmed by a molecular assay or viral culture.      ______________________________________________________________________    I explained my diagnostic considerations and recommendations to the patient, who voiced understanding and agreement with the treatment plan.   All questions were answered.   We discussed potential side effects, risks and benefits of any prescribed or recommended therapies, as well as expectations for response to treatments.  Please see AVS for any patient instructions & handouts given.   Patient was advised to contact the Nurse Care Line, their Primary Care provider, Urgent Care, or the Emergency Department if there are new or worsening symptoms, or call 911 for emergencies.

## 2024-06-14 NOTE — LETTER
June 14, 2024      Shahbaz Nam  [address] [place] [address]        To Whom It May Concern:    Shahbaz Nam was seen in our clinic.       Sincerely,      ERIN Gan CNP

## 2024-08-14 ENCOUNTER — OFFICE VISIT (OUTPATIENT)
Dept: FAMILY MEDICINE | Facility: CLINIC | Age: 39
End: 2024-08-14
Payer: COMMERCIAL

## 2024-08-14 VITALS
TEMPERATURE: 98.3 F | BODY MASS INDEX: 16.51 KG/M2 | DIASTOLIC BLOOD PRESSURE: 67 MMHG | WEIGHT: 79 LBS | HEART RATE: 76 BPM | OXYGEN SATURATION: 98 % | SYSTOLIC BLOOD PRESSURE: 93 MMHG | RESPIRATION RATE: 16 BRPM

## 2024-08-14 DIAGNOSIS — J30.2 SEASONAL ALLERGIC RHINITIS, UNSPECIFIED TRIGGER: Primary | ICD-10-CM

## 2024-08-14 DIAGNOSIS — R07.0 THROAT PAIN: ICD-10-CM

## 2024-08-14 LAB
DEPRECATED S PYO AG THROAT QL EIA: NEGATIVE
GROUP A STREP BY PCR: NOT DETECTED

## 2024-08-14 PROCEDURE — 99213 OFFICE O/P EST LOW 20 MIN: CPT | Performed by: FAMILY MEDICINE

## 2024-08-14 PROCEDURE — 87651 STREP A DNA AMP PROBE: CPT | Performed by: FAMILY MEDICINE

## 2024-08-14 RX ORDER — CETIRIZINE HYDROCHLORIDE 10 MG/1
10 TABLET ORAL DAILY
Qty: 30 TABLET | Refills: 1 | Status: SHIPPED | OUTPATIENT
Start: 2024-08-14

## 2024-08-14 RX ORDER — FLUTICASONE PROPIONATE 50 MCG
1 SPRAY, SUSPENSION (ML) NASAL DAILY
Qty: 16 G | Refills: 0 | Status: SHIPPED | OUTPATIENT
Start: 2024-08-14

## 2024-08-14 NOTE — PROGRESS NOTES
Assessment:       Seasonal allergic rhinitis, unspecified trigger  - cetirizine (ZYRTEC) 10 MG tablet  Dispense: 30 tablet; Refill: 1  - fluticasone (FLONASE) 50 MCG/ACT nasal spray  Dispense: 16 g; Refill: 0    Throat pain  - Streptococcus A Rapid Screen w/Reflex to PCR - Clinic Collect  - Group A Streptococcus PCR Throat Swab       Plan:     Symptoms consistent with flareup of her seasonal allergic rhinitis.  Will start cetirizine as well as Flonase nasal spray.  Rapid strep screen is negative.  No indication for antibiotics at this time.  Follow-up if symptoms getting worse or not improving over the next week.    MEDICATIONS:   Orders Placed This Encounter   Medications    cetirizine (ZYRTEC) 10 MG tablet     Sig: Take 1 tablet (10 mg) by mouth daily     Dispense:  30 tablet     Refill:  1    fluticasone (FLONASE) 50 MCG/ACT nasal spray     Sig: Spray 1 spray into both nostrils daily     Dispense:  16 g     Refill:  0       Subjective:       39 year old female with known seasonal allergies presents for evaluation of a 1 week history of itchy eyes, itchy nose, postnasal drip, and scratchy itchy throat.  Denies much cough.  Her ears do not hurt.  No fevers or chills or fatigue.  She has not tried anything for symptoms other than occasional cetirizine.    Patient Active Problem List   Diagnosis    Vitamin D Deficiency    Scoliosis    Amenorrhea    Restrictive lung disease    Elevated WBC count    Seasonal allergies    SVT (supraventricular tachycardia) (H24)    Anemia, chronic disease       Past Medical History:   Diagnosis Date    Abnormal CXR (chest x-ray) 12/2017    right chest volume loss    Anemia, chronic disease 1/9/2020    Scoliosis     SVT (supraventricular tachycardia) (H24)     Tuberculosis     had pulmonary TB in 2001, received 4 drug regimen.        Past Surgical History:   Procedure Laterality Date    OTHER SURGICAL HISTORY  04/2016    ablation for svt    OTHER SURGICAL HISTORY      AVNRT ablation     MS LAP,APPENDECTOMY N/A 7/5/2019    Procedure: APPENDECTOMY, LAPAROSCOPIC;  Surgeon: Payam Tran MD;  Location: [facility];  Service: General       Current Outpatient Medications   Medication Sig Dispense Refill    cetirizine (ZYRTEC) 10 MG tablet Take 1 tablet (10 mg) by mouth daily 30 tablet 1    fluticasone (FLONASE) 50 MCG/ACT nasal spray Spray 1 spray into both nostrils daily 16 g 0    acetaminophen (TYLENOL) 500 MG tablet Take 2 tablets (1,000 mg) by mouth every 8 hours as needed for mild pain, fever or pain (Patient not taking: Reported on 4/5/2024) 30 tablet 0    albuterol (PROAIR HFA/PROVENTIL HFA/VENTOLIN HFA) 108 (90 Base) MCG/ACT inhaler Inhale 2 puffs into the lungs every 6 hours as needed for shortness of breath, wheezing or cough (Patient not taking: Reported on 8/14/2024) 18 g 0    benzonatate (TESSALON) 200 MG capsule Take 1 capsule (200 mg) by mouth 3 times daily as needed for cough (Patient not taking: Reported on 6/14/2024) 30 capsule 0    cetirizine (ZYRTEC) 10 MG tablet Take 1 tablet (10 mg) by mouth daily as needed for allergies (allergies) (Patient not taking: Reported on 4/5/2024) 30 tablet 0    docusate sodium (COLACE) 100 MG capsule Take 1 capsule (100 mg) by mouth 2 times daily (Patient not taking: Reported on 6/30/2022) 180 capsule 3    EPINEPHrine (EPIPEN) 0.3 mg/0.3 mL injection [EPINEPHRINE (EPIPEN) 0.3 MG/0.3 ML INJECTION] Inject 0.3 mL (0.3 mg total) into the shoulder, thigh, or buttocks as needed for anaphylaxis. Inject into thigh. (Patient not taking: Reported on 3/18/2024) 1 0    fluticasone (FLONASE) 50 MCG/ACT nasal spray Spray 2 sprays into both nostrils daily (Patient not taking: Reported on 6/14/2024) 16 g 2    ibuprofen (ADVIL/MOTRIN) 200 MG tablet Take 2 tablets (400 mg) by mouth every 6 hours as needed for pain or fever (Patient not taking: Reported on 4/5/2024) 30 tablet 0    meclizine (ANTIVERT) 25 MG tablet Take 1 tablet (25 mg) by mouth 3 times daily as  needed for dizziness (Patient not taking: Reported on 5/20/2023) 15 tablet 0    meclizine (ANTIVERT) 25 mg tablet [MECLIZINE (ANTIVERT) 25 MG TABLET] Take 1 tablet (25 mg total) by mouth 3 (three) times a day as needed for dizziness or nausea. (Patient not taking: Reported on 2/25/2022) 30 tablet 1    sulfamethoxazole-trimethoprim (BACTRIM DS) 800-160 MG tablet Take 1 tablet by mouth 2 times daily (Patient not taking: Reported on 3/18/2024) 6 tablet 0     No current facility-administered medications for this visit.       Allergies   Allergen Reactions    Bee Venom Protein (Honey Bee) [Bee Venom] Anaphylaxis and Itching    Other Food Allergy Anaphylaxis     Frog and Turtle    Shellfish Allergy Hives    Wasp Venom [Wasp Venom Protein] Anaphylaxis and Itching       Family History   Problem Relation Age of Onset    No Known Problems Mother     Tuberculosis Father     No Known Problems Child     No Known Problems Child     No Known Problems Child        Social History     Socioeconomic History    Marital status: Single     Spouse name: None    Number of children: 3    Years of education: None    Highest education level: None   Tobacco Use    Smoking status: Never     Passive exposure: Past    Smokeless tobacco: Never   Vaping Use    Vaping status: Former   Substance and Sexual Activity    Alcohol use: No    Drug use: No    Sexual activity: Yes     Partners: Male     Birth control/protection: Injection   Social History Narrative    She has 3 children.     Social Determinants of Health     Food Insecurity: No Food Insecurity (6/27/2024)    Received from [facility]    Hunger Vital Sign     Worried About Running Out of Food in the Last Year: Never true     Ran Out of Food in the Last Year: Never true   Transportation Needs: No Transportation Needs (6/27/2024)    [facility]    PRAPARE - Transportation     Lack of Transportation (Medical): No     Lack of Transportation (Non-Medical): No   Interpersonal  Safety: Unknown (6/27/2024)    Received from [email or web address]    Humiliation, Afraid, Rape, and Kick questionnaire     Emotionally Abused: No     Physically Abused: No     Sexually Abused: No   Housing Stability: Unknown (6/27/2024)    Received from [email or web address]    Housing Stability Vital Sign     Unable to Pay for Housing in the Last Year: No     Unstable Housing in the Last Year: No         Review of Systems  Pertinent items are noted in HPI.      Objective:                 General Appearance:    BP 93/67   Pulse 76   Temp 98.3  F (36.8  C) (Oral)   Resp 16   Wt 35.8 kg (79 lb)   SpO2 98%   BMI 16.51 kg/m          Alert, pleasant, cooperative, no distress, appears stated age   Head:    Normocephalic, without obvious abnormality, atraumatic   Eyes:    Conjunctiva/corneas clear   Ears:    Normal TM's without erythema or bulging. Normal external ear canals, both ears   Nose:   Nares normal, septum midline, mucosa normal, no drainage    or sinus tenderness   Throat:   Lips, mucosa, and tongue normal; teeth and gums normal.  No tonsilar hypertrophy or exudate.   Neck:   Supple, symmetrical, trachea midline, no adenopathy    Lungs:     Clear to auscultation bilaterally without wheezes, rales, or rhonchi, respirations unlabored    Heart:    Regular rate and rhythm, S1 and S2 normal, no murmur, rub or gallop       Extremities:   Extremities normal, atraumatic, no cyanosis or edema   Skin:   Skin color, texture, turgor normal, no rashes or lesions         This note has been dictated using voice recognition software. Any grammatical or context distortions are unintentional and inherent to the software

## 2024-08-14 NOTE — LETTER
August 14, 2024      Shahbaz Nam  [address] [place] [address]        To Whom It May Concern:    Shahbaz Nam  was seen on 8/14/2024.  Please excuse her 8/14/2024  due to illness.        Sincerely,        Adriana Leonardo MD

## 2025-07-16 ENCOUNTER — ANCILLARY PROCEDURE (OUTPATIENT)
Dept: GENERAL RADIOLOGY | Facility: CLINIC | Age: 40
End: 2025-07-16
Attending: PHYSICIAN ASSISTANT
Payer: COMMERCIAL

## 2025-07-16 ENCOUNTER — OFFICE VISIT (OUTPATIENT)
Dept: URGENT CARE | Facility: URGENT CARE | Age: 40
End: 2025-07-16
Payer: COMMERCIAL

## 2025-07-16 VITALS
OXYGEN SATURATION: 98 % | HEART RATE: 80 BPM | BODY MASS INDEX: 17.39 KG/M2 | SYSTOLIC BLOOD PRESSURE: 98 MMHG | TEMPERATURE: 98.3 F | RESPIRATION RATE: 16 BRPM | WEIGHT: 83.2 LBS | DIASTOLIC BLOOD PRESSURE: 70 MMHG

## 2025-07-16 DIAGNOSIS — J06.9 VIRAL URI: Primary | ICD-10-CM

## 2025-07-16 DIAGNOSIS — J98.4 RESTRICTIVE LUNG DISEASE: ICD-10-CM

## 2025-07-16 LAB
DEPRECATED S PYO AG THROAT QL EIA: NEGATIVE
S PYO DNA THROAT QL NAA+PROBE: NOT DETECTED

## 2025-07-16 PROCEDURE — 3078F DIAST BP <80 MM HG: CPT | Performed by: PHYSICIAN ASSISTANT

## 2025-07-16 PROCEDURE — 71046 X-RAY EXAM CHEST 2 VIEWS: CPT | Mod: TC | Performed by: RADIOLOGY

## 2025-07-16 PROCEDURE — 99214 OFFICE O/P EST MOD 30 MIN: CPT | Performed by: PHYSICIAN ASSISTANT

## 2025-07-16 PROCEDURE — 87651 STREP A DNA AMP PROBE: CPT | Performed by: PHYSICIAN ASSISTANT

## 2025-07-16 PROCEDURE — 3074F SYST BP LT 130 MM HG: CPT | Performed by: PHYSICIAN ASSISTANT

## 2025-07-16 NOTE — PROGRESS NOTES
Assessment & Plan:      Problem List Items Addressed This Visit          Respiratory/Allergy    Restrictive lung disease     Other Visit Diagnoses         Viral URI    -  Primary    Relevant Orders    Streptococcus A Rapid Screen w/Reflex to PCR - Clinic Collect (Completed)    Group A Streptococcus PCR Throat Swab    XR Chest 2 Views          Medical Decision Making  Patient presents for cough, rhinorrhea, sore throat, fevers, and headaches progressing over the last week.  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  X-ray otherwise appears unchanged from previous with no signs of left lung pneumonia per my interpretation.  Patient also appears stable here at the clinic.  Suspect symptoms are likely due to a viral upper respiratory infection.  Recommend patient continue with conservative measures.  Discussed treatment and symptomatic care.  Allergies and medication interactions reviewed.  Discussed signs of worsening symptoms and when to follow-up with PCP if no symptom improvement.     Subjective:      History provided by the patient.  She is here with her daughter who is helping interpret.  Shahbaz Nam is a 40 year old female with history of restrictive lung disease, here for evaluation of cough, rhinorrhea, sore throat, fevers, and headaches.  Onset of symptoms was 1 week ago.  No other known sick contacts at home.  Patient has history of pneumonia.  Fevers were last noted over 2 days ago.  No fever since then.     The following portions of the patient's history were reviewed and updated as appropriate: allergies, current medications, and problem list.     Review of Systems  Pertinent items are noted in HPI.    Allergies  Allergies   Allergen Reactions    Bee Venom  Protein (Honey Bee) [Bee Venom] Anaphylaxis and Itching    Other Food Allergy Anaphylaxis     Frog and Turtle    Shellfish Allergy Hives    Wasp Venom [Wasp Venom Protein] Anaphylaxis and Itching       Family History   Problem Relation Age of Onset    No Known Problems Mother     Tuberculosis Father     No Known Problems Child     No Known Problems Child     No Known Problems Child        Social History     Tobacco Use    Smoking status: Never     Passive exposure: Past    Smokeless tobacco: Never   Substance Use Topics    Alcohol use: No        Objective:      BP 98/70   Pulse 80   Temp 98.3  F (36.8  C) (Oral)   Resp 16   Wt 37.7 kg (83 lb 3.2 oz)   SpO2 98%   BMI 17.39 kg/m    General appearance - alert, well appearing, and in no distress and non-toxic  Ears - bilateral TM's and external ear canals normal  Nose - normal and patent, no erythema, discharge or polyps  Mouth - mucous membranes moist, pharynx normal without lesions  Neck - supple, no significant adenopathy  Chest - Rales heard in the upper lobes bilaterally, no wheezing  Heart - normal rate, regular rhythm, normal S1, S2, no murmurs, rubs, clicks or gallops     Lab & Imaging Results    Recent Results (from the past 24 hours)   Streptococcus A Rapid Screen w/Reflex to PCR - Clinic Collect    Specimen: Throat; Swab   Result Value Ref Range    Group A Strep antigen Negative Negative       I personally reviewed these results and discussed findings with the patient.    The use of Dragon/[email or web address] dictation services was used to construct the content of this note; any grammatical errors are non-intentional. Please contact the author directly if you are in need of any clarification.

## 2025-07-16 NOTE — PROGRESS NOTES
Urgent Care Clinic Visit    Chief Complaint   Patient presents with    Cough     Has had cough sore throat fever headache for 1 week